# Patient Record
Sex: FEMALE | Race: WHITE | URBAN - METROPOLITAN AREA
[De-identification: names, ages, dates, MRNs, and addresses within clinical notes are randomized per-mention and may not be internally consistent; named-entity substitution may affect disease eponyms.]

---

## 2021-07-28 ENCOUNTER — LAB ENCOUNTER (OUTPATIENT)
Dept: LAB | Age: 41
End: 2021-07-28
Attending: INTERNAL MEDICINE
Payer: COMMERCIAL

## 2021-07-28 ENCOUNTER — OFFICE VISIT (OUTPATIENT)
Dept: INTERNAL MEDICINE CLINIC | Facility: CLINIC | Age: 41
End: 2021-07-28
Payer: COMMERCIAL

## 2021-07-28 VITALS
HEIGHT: 68 IN | WEIGHT: 293 LBS | DIASTOLIC BLOOD PRESSURE: 80 MMHG | HEART RATE: 90 BPM | OXYGEN SATURATION: 98 % | BODY MASS INDEX: 44.41 KG/M2 | SYSTOLIC BLOOD PRESSURE: 112 MMHG

## 2021-07-28 DIAGNOSIS — N20.0 NEPHROLITHIASIS: ICD-10-CM

## 2021-07-28 DIAGNOSIS — I87.2 VENOUS INSUFFICIENCY OF LEFT LOWER EXTREMITY: ICD-10-CM

## 2021-07-28 DIAGNOSIS — Z00.00 PHYSICAL EXAM: ICD-10-CM

## 2021-07-28 DIAGNOSIS — E66.01 CLASS 3 SEVERE OBESITY WITHOUT SERIOUS COMORBIDITY WITH BODY MASS INDEX (BMI) OF 45.0 TO 49.9 IN ADULT, UNSPECIFIED OBESITY TYPE (HCC): ICD-10-CM

## 2021-07-28 DIAGNOSIS — E03.9 HYPOTHYROIDISM, UNSPECIFIED TYPE: ICD-10-CM

## 2021-07-28 DIAGNOSIS — Z00.00 PHYSICAL EXAM: Primary | ICD-10-CM

## 2021-07-28 LAB
ALBUMIN SERPL-MCNC: 3.8 G/DL (ref 3.4–5)
ALBUMIN/GLOB SERPL: 0.9 {RATIO} (ref 1–2)
ALP LIVER SERPL-CCNC: 74 U/L
ALT SERPL-CCNC: 46 U/L
ANION GAP SERPL CALC-SCNC: 5 MMOL/L (ref 0–18)
AST SERPL-CCNC: 18 U/L (ref 15–37)
BASOPHILS # BLD AUTO: 0.04 X10(3) UL (ref 0–0.2)
BASOPHILS NFR BLD AUTO: 0.4 %
BILIRUB SERPL-MCNC: 0.4 MG/DL (ref 0.1–2)
BUN BLD-MCNC: 14 MG/DL (ref 7–18)
BUN/CREAT SERPL: 19.4 (ref 10–20)
CALCIUM BLD-MCNC: 8.6 MG/DL (ref 8.5–10.1)
CHLORIDE SERPL-SCNC: 108 MMOL/L (ref 98–112)
CHOLEST SMN-MCNC: 201 MG/DL (ref ?–200)
CO2 SERPL-SCNC: 26 MMOL/L (ref 21–32)
CREAT BLD-MCNC: 0.72 MG/DL
DEPRECATED RDW RBC AUTO: 44.8 FL (ref 35.1–46.3)
EOSINOPHIL # BLD AUTO: 0.26 X10(3) UL (ref 0–0.7)
EOSINOPHIL NFR BLD AUTO: 2.4 %
ERYTHROCYTE [DISTWIDTH] IN BLOOD BY AUTOMATED COUNT: 13.7 % (ref 11–15)
EST. AVERAGE GLUCOSE BLD GHB EST-MCNC: 108 MG/DL (ref 68–126)
GLOBULIN PLAS-MCNC: 4.1 G/DL (ref 2.8–4.4)
GLUCOSE BLD-MCNC: 86 MG/DL (ref 70–99)
HBA1C MFR BLD HPLC: 5.4 % (ref ?–5.7)
HCT VFR BLD AUTO: 44.1 %
HDLC SERPL-MCNC: 35 MG/DL (ref 40–59)
HGB BLD-MCNC: 14.6 G/DL
IMM GRANULOCYTES # BLD AUTO: 0.04 X10(3) UL (ref 0–1)
IMM GRANULOCYTES NFR BLD: 0.4 %
LDLC SERPL CALC-MCNC: 132 MG/DL (ref ?–100)
LYMPHOCYTES # BLD AUTO: 2.76 X10(3) UL (ref 1–4)
LYMPHOCYTES NFR BLD AUTO: 25.8 %
M PROTEIN MFR SERPL ELPH: 7.9 G/DL (ref 6.4–8.2)
MCH RBC QN AUTO: 29.6 PG (ref 26–34)
MCHC RBC AUTO-ENTMCNC: 33.1 G/DL (ref 31–37)
MCV RBC AUTO: 89.5 FL
MONOCYTES # BLD AUTO: 0.68 X10(3) UL (ref 0.1–1)
MONOCYTES NFR BLD AUTO: 6.3 %
NEUTROPHILS # BLD AUTO: 6.93 X10 (3) UL (ref 1.5–7.7)
NEUTROPHILS # BLD AUTO: 6.93 X10(3) UL (ref 1.5–7.7)
NEUTROPHILS NFR BLD AUTO: 64.7 %
NONHDLC SERPL-MCNC: 166 MG/DL (ref ?–130)
OSMOLALITY SERPL CALC.SUM OF ELEC: 288 MOSM/KG (ref 275–295)
PATIENT FASTING Y/N/NP: YES
PATIENT FASTING Y/N/NP: YES
PLATELET # BLD AUTO: 312 10(3)UL (ref 150–450)
POTASSIUM SERPL-SCNC: 3.8 MMOL/L (ref 3.5–5.1)
RBC # BLD AUTO: 4.93 X10(6)UL
SODIUM SERPL-SCNC: 139 MMOL/L (ref 136–145)
T3FREE SERPL-MCNC: 2.5 PG/ML (ref 2.4–4.2)
T4 FREE SERPL-MCNC: 0.9 NG/DL (ref 0.8–1.7)
TRIGL SERPL-MCNC: 188 MG/DL (ref 30–149)
TSI SER-ACNC: 4.22 MIU/ML (ref 0.36–3.74)
VLDLC SERPL CALC-MCNC: 34 MG/DL (ref 0–30)
WBC # BLD AUTO: 10.7 X10(3) UL (ref 4–11)

## 2021-07-28 PROCEDURE — 3079F DIAST BP 80-89 MM HG: CPT | Performed by: INTERNAL MEDICINE

## 2021-07-28 PROCEDURE — 3008F BODY MASS INDEX DOCD: CPT | Performed by: INTERNAL MEDICINE

## 2021-07-28 PROCEDURE — 84439 ASSAY OF FREE THYROXINE: CPT

## 2021-07-28 PROCEDURE — 36415 COLL VENOUS BLD VENIPUNCTURE: CPT

## 2021-07-28 PROCEDURE — 80053 COMPREHEN METABOLIC PANEL: CPT

## 2021-07-28 PROCEDURE — 83036 HEMOGLOBIN GLYCOSYLATED A1C: CPT

## 2021-07-28 PROCEDURE — 84481 FREE ASSAY (FT-3): CPT

## 2021-07-28 PROCEDURE — 3074F SYST BP LT 130 MM HG: CPT | Performed by: INTERNAL MEDICINE

## 2021-07-28 PROCEDURE — 99386 PREV VISIT NEW AGE 40-64: CPT | Performed by: INTERNAL MEDICINE

## 2021-07-28 PROCEDURE — 84443 ASSAY THYROID STIM HORMONE: CPT

## 2021-07-28 PROCEDURE — 80061 LIPID PANEL: CPT

## 2021-07-28 PROCEDURE — 85025 COMPLETE CBC W/AUTO DIFF WBC: CPT

## 2021-07-28 NOTE — PROGRESS NOTES
HPI:    Patient ID: Alejandro Turcios is a 39year old female. Presents to the office for the first time to establish care. Has h/o hypothyroidism--currently not taking any thyroid supplementation.   Had been on and off this medicine over the past alana pain, palpitations and leg swelling. Gastrointestinal: Negative for abdominal pain, blood in stool, constipation, diarrhea, nausea and vomiting. Endocrine: Negative for cold intolerance and heat intolerance.    Genitourinary: Negative for dysuria, hemat distension. Palpations: Abdomen is soft. Tenderness: There is no abdominal tenderness. There is no right CVA tenderness or left CVA tenderness. Musculoskeletal:      Cervical back: Neck supple. No rigidity. Right lower leg: No edema. leg elevation when seated and compression stockings daily. We discussed the importance of weight loss through good dietary habits and regular exercise. We will also refer to bariatric center for further evaluation--referral given.     There has been no

## 2021-09-15 ENCOUNTER — TELEPHONE (OUTPATIENT)
Dept: INTERNAL MEDICINE CLINIC | Facility: CLINIC | Age: 41
End: 2021-09-15

## 2021-09-15 NOTE — TELEPHONE ENCOUNTER
Secondary to continued respiratory symptoms, patient should be reevaluated at immediate care/urgent care. We will need repeat Covid testing as first Covid testing may not always be positive.   May also need chest x-ray PA and lateral which can be done at i

## 2021-09-15 NOTE — TELEPHONE ENCOUNTER
Called patient and relayed DR. BAUMANN message - verbalized understanding and will go to [facility] F/U9/16

## 2021-09-15 NOTE — TELEPHONE ENCOUNTER
Patient scheduled an appointment today with Dr Tamir Finch at 6:40pm. Patient stated the reason for her appointment is \"congested cough. \" Np others symptoms are listed. Patient was not called by . Routed to triage on high.   Patient's appointment

## 2021-09-15 NOTE — TELEPHONE ENCOUNTER
Spoke to Joseph London noticed a cough on Friday afternoon  Started feeling \"sick\" on Saturday  Went to [address] [place] [address] in [facility] on Sunday. States COVID pcr was done and came back the next day negative.  Was told she had virus and to kacey

## 2021-09-16 NOTE — TELEPHONE ENCOUNTER
To Dr. Clay Jon - /[facility]/  Pt feeling slightly better. Repeat covid negative, chest xray no pneumonia. Treating as bronchitis - rx'd with prednisone. Denies fever/chills. Pt asking for best protocol, this happens yearly - abx?   Or just treat sy

## 2021-09-16 NOTE — TELEPHONE ENCOUNTER
Would not prescribe prophylactic antibiotics. Would need to decide about treatment depending upon symptoms.

## 2021-12-06 ENCOUNTER — OFFICE VISIT (OUTPATIENT)
Dept: OBGYN CLINIC | Facility: CLINIC | Age: 41
End: 2021-12-06
Payer: COMMERCIAL

## 2021-12-06 VITALS
HEART RATE: 90 BPM | BODY MASS INDEX: 47 KG/M2 | DIASTOLIC BLOOD PRESSURE: 83 MMHG | SYSTOLIC BLOOD PRESSURE: 118 MMHG | WEIGHT: 293 LBS

## 2021-12-06 DIAGNOSIS — Z12.31 VISIT FOR SCREENING MAMMOGRAM: ICD-10-CM

## 2021-12-06 DIAGNOSIS — E66.01 CLASS 3 SEVERE OBESITY WITHOUT SERIOUS COMORBIDITY WITH BODY MASS INDEX (BMI) OF 45.0 TO 49.9 IN ADULT, UNSPECIFIED OBESITY TYPE (HCC): ICD-10-CM

## 2021-12-06 DIAGNOSIS — Z01.419 ENCOUNTER FOR GYNECOLOGICAL EXAMINATION WITHOUT ABNORMAL FINDING: Primary | ICD-10-CM

## 2021-12-06 DIAGNOSIS — N92.6 IRREGULAR MENSES: ICD-10-CM

## 2021-12-06 PROCEDURE — 99386 PREV VISIT NEW AGE 40-64: CPT | Performed by: OBSTETRICS & GYNECOLOGY

## 2021-12-06 PROCEDURE — 99202 OFFICE O/P NEW SF 15 MIN: CPT | Performed by: OBSTETRICS & GYNECOLOGY

## 2021-12-06 PROCEDURE — 3074F SYST BP LT 130 MM HG: CPT | Performed by: OBSTETRICS & GYNECOLOGY

## 2021-12-06 PROCEDURE — 3079F DIAST BP 80-89 MM HG: CPT | Performed by: OBSTETRICS & GYNECOLOGY

## 2021-12-06 NOTE — PROGRESS NOTES
Heidy Delgado is a 39year old female [facility] Patient's last menstrual period was 11/15/2021. Patient presents with:  Gyn Exam: ANNUAL EXAM   .     Patient is a new patient for me and has not had a GYN exam or Pap since 2018.   She denies any history of FAMILY HISTORY:  Family History   Problem Relation Age of Onset   • Other (hypothyroid) Mother    • Other (dm) Father    • Other (bladder cancer) Maternal Grandmother    • Other (lung cancer) Paternal Grandmother        MEDICATIONS:  No current outpati person and situation.  Appropriate mood and affect    Pelvic Exam:  External Genitalia: normal appearance, hair distribution, +lipoma on left buttock-asymptomatic  Urethral Meatus:  normal in size, location, without lesions and prolapse  Bladder:  No fullne

## 2022-05-02 ENCOUNTER — HOSPITAL ENCOUNTER (OUTPATIENT)
Dept: MAMMOGRAPHY | Age: 42
Discharge: HOME | End: 2022-05-02
Attending: OBSTETRICS & GYNECOLOGY
Payer: COMMERCIAL

## 2022-05-02 DIAGNOSIS — Z12.31 VISIT FOR SCREENING MAMMOGRAM: ICD-10-CM

## 2022-05-02 PROCEDURE — 77067 SCR MAMMO BI INCL CAD: CPT | Performed by: OBSTETRICS & GYNECOLOGY

## 2022-05-02 PROCEDURE — 77063 BREAST TOMOSYNTHESIS BI: CPT | Performed by: OBSTETRICS & GYNECOLOGY

## 2022-05-18 ENCOUNTER — HOSPITAL ENCOUNTER (OUTPATIENT)
Dept: ULTRASOUND IMAGING | Facility: HOSPITAL | Age: 42
Discharge: HOME | End: 2022-05-18
Attending: OBSTETRICS & GYNECOLOGY
Payer: COMMERCIAL

## 2022-05-18 ENCOUNTER — HOSPITAL ENCOUNTER (OUTPATIENT)
Dept: MAMMOGRAPHY | Facility: HOSPITAL | Age: 42
Discharge: HOME | End: 2022-05-18
Attending: OBSTETRICS & GYNECOLOGY
Payer: COMMERCIAL

## 2022-05-18 DIAGNOSIS — R92.8 ABNORMAL MAMMOGRAM: ICD-10-CM

## 2022-05-18 PROCEDURE — 76642 ULTRASOUND BREAST LIMITED: CPT | Performed by: OBSTETRICS & GYNECOLOGY

## 2022-05-18 PROCEDURE — 77061 BREAST TOMOSYNTHESIS UNI: CPT | Performed by: OBSTETRICS & GYNECOLOGY

## 2022-05-18 PROCEDURE — 77065 DX MAMMO INCL CAD UNI: CPT | Performed by: OBSTETRICS & GYNECOLOGY

## 2022-05-25 ENCOUNTER — HOSPITAL ENCOUNTER (OUTPATIENT)
Dept: MAMMOGRAPHY | Facility: HOSPITAL | Age: 42
Discharge: HOME | End: 2022-05-25
Attending: OBSTETRICS & GYNECOLOGY
Payer: COMMERCIAL

## 2022-05-25 DIAGNOSIS — N63.10 BREAST MASS, RIGHT: ICD-10-CM

## 2022-05-25 PROCEDURE — 19081 BX BREAST 1ST LESION STRTCTC: CPT | Performed by: OBSTETRICS & GYNECOLOGY

## 2022-05-25 PROCEDURE — 88305 TISSUE EXAM BY PATHOLOGIST: CPT | Performed by: OBSTETRICS & GYNECOLOGY

## 2022-05-25 NOTE — PROCEDURES
[facility]  Procedure Note    Leanne Holiday Patient Status:  Outpatient    1980 MRN [id number]   Location [address] [place] [address] Attending Aakash Huddleston MD   Hosp Day # 0 PCP Jolynn Shepard MD     Procedure: tomosynthesis guided biopsy of the right breast    Pre-Procedure Diagnosis:  Right breast lateral central focal asymmetry    Post-Procedure Diagnosis: Right breast lateral central focal asymmetry    Anesthesia:  Local    Findings:  Right breast lateral central focal asymmetry    Specimens: 6    Blood Loss:  minimal    Tourniquet Time: none  Complications:  None  Drains:  None    Alison Stout DO  2022

## 2022-11-15 ENCOUNTER — TELEPHONE (OUTPATIENT)
Dept: OBGYN CLINIC | Facility: CLINIC | Age: 42
End: 2022-11-15

## 2022-11-15 DIAGNOSIS — N64.89 BREAST ASYMMETRY: Primary | ICD-10-CM

## 2022-11-25 ENCOUNTER — HOSPITAL ENCOUNTER (OUTPATIENT)
Dept: MAMMOGRAPHY | Facility: HOSPITAL | Age: 42
Discharge: HOME | End: 2022-11-25
Attending: OBSTETRICS & GYNECOLOGY
Payer: COMMERCIAL

## 2022-11-25 DIAGNOSIS — N64.89 BREAST ASYMMETRY: ICD-10-CM

## 2022-11-25 PROCEDURE — 77065 DX MAMMO INCL CAD UNI: CPT | Performed by: OBSTETRICS & GYNECOLOGY

## 2022-11-25 PROCEDURE — 77061 BREAST TOMOSYNTHESIS UNI: CPT | Performed by: OBSTETRICS & GYNECOLOGY

## 2022-12-07 ENCOUNTER — TELEPHONE (OUTPATIENT)
Dept: OBGYN CLINIC | Facility: CLINIC | Age: 42
End: 2022-12-07

## 2022-12-07 DIAGNOSIS — R92.8 CATEGORY 3 MAMMOGRAPHY RESULT WITH SHORT FOLLOW-UP INTERVAL SUGGESTED FOR PROBABLY BENIGN FINDING: Primary | ICD-10-CM

## 2022-12-07 NOTE — TELEPHONE ENCOUNTER
----- Message from Feli Quiñones MD sent at 12/4/2022  6:44 AM CST -----  Benign appearing findings on mammogram, repeat bilateral diagnostic mammogram in 6 months,please order and call pt

## 2023-12-11 ENCOUNTER — TELEPHONE (OUTPATIENT)
Dept: OBGYN CLINIC | Facility: CLINIC | Age: 43
End: 2023-12-11

## 2023-12-11 DIAGNOSIS — Z12.31 ENCOUNTER FOR SCREENING MAMMOGRAM FOR MALIGNANT NEOPLASM OF BREAST: Primary | ICD-10-CM

## 2023-12-11 NOTE — TELEPHONE ENCOUNTER
Last annual: 12/6/2021 w/CAP  Next annual: 4/23/2024 w/CAP  Last Mammogram was right side diagnostic with recs to f/u in 6 months for annual screening. Order for screening Mammogram placed.      PSR when pt calls back please let her know order is placed and give her number to central [phone]

## 2024-04-29 ENCOUNTER — TELEPHONE (OUTPATIENT)
Dept: OBGYN CLINIC | Facility: CLINIC | Age: 44
End: 2024-04-29

## 2024-04-29 NOTE — TELEPHONE ENCOUNTER
Mammography reached out to patient who would like to have diagnostic follow up bilateral instead of screening as that was recommended. Please put in new order.

## 2024-04-29 NOTE — TELEPHONE ENCOUNTER
Jeanie called, states Mammogram should be diagnostic based on previous Mammogram. Reviewed with Jeanie 11/25/2022 Mammogram that indicates routine screening. States understanding.

## 2024-05-01 ENCOUNTER — HOSPITAL ENCOUNTER (OUTPATIENT)
Dept: MAMMOGRAPHY | Age: 44
Discharge: HOME | End: 2024-05-01
Attending: OBSTETRICS & GYNECOLOGY
Payer: COMMERCIAL

## 2024-05-01 DIAGNOSIS — Z12.31 ENCOUNTER FOR SCREENING MAMMOGRAM FOR MALIGNANT NEOPLASM OF BREAST: ICD-10-CM

## 2024-05-01 PROCEDURE — 77063 BREAST TOMOSYNTHESIS BI: CPT | Performed by: OBSTETRICS & GYNECOLOGY

## 2024-05-01 PROCEDURE — 77067 SCR MAMMO BI INCL CAD: CPT | Performed by: OBSTETRICS & GYNECOLOGY

## 2024-05-07 ENCOUNTER — OFFICE VISIT (OUTPATIENT)
Dept: OBGYN CLINIC | Facility: CLINIC | Age: 44
End: 2024-05-07
Payer: COMMERCIAL

## 2024-05-07 VITALS
HEART RATE: 80 BPM | SYSTOLIC BLOOD PRESSURE: 123 MMHG | BODY MASS INDEX: 47 KG/M2 | WEIGHT: 293 LBS | DIASTOLIC BLOOD PRESSURE: 86 MMHG

## 2024-05-07 DIAGNOSIS — Z30.09 GENERAL COUNSELING AND ADVICE FOR CONTRACEPTIVE MANAGEMENT: ICD-10-CM

## 2024-05-07 DIAGNOSIS — N92.0 MENORRHAGIA WITH REGULAR CYCLE: ICD-10-CM

## 2024-05-07 DIAGNOSIS — Z01.419 ENCOUNTER FOR GYNECOLOGICAL EXAMINATION WITHOUT ABNORMAL FINDING: Primary | ICD-10-CM

## 2024-05-07 PROCEDURE — 99396 PREV VISIT EST AGE 40-64: CPT | Performed by: OBSTETRICS & GYNECOLOGY

## 2024-05-07 PROCEDURE — 99212 OFFICE O/P EST SF 10 MIN: CPT | Performed by: OBSTETRICS & GYNECOLOGY

## 2024-05-07 PROCEDURE — 3074F SYST BP LT 130 MM HG: CPT | Performed by: OBSTETRICS & GYNECOLOGY

## 2024-05-07 PROCEDURE — 3079F DIAST BP 80-89 MM HG: CPT | Performed by: OBSTETRICS & GYNECOLOGY

## 2024-05-07 NOTE — PROGRESS NOTES
Debbie Somers is a 44 year old female  Patient's last menstrual period was 2024 (exact date).    Chief Complaint   Patient presents with    Physical     annual   .     Her cycles are  regular but on the heavier side- she would also like contraception.  We discussed the risks and benefits of mirena IUD and pt info pamphlet given.  We also discussed nuvaring as an option.  She will call me when she decides.  10 min spent in discussion.   She started wegovy in August until December and lost 30#.      OBSTETRICS HISTORY:  OB History    Para Term  AB Living   0 0 0 0 0 0   SAB IAB Ectopic Multiple Live Births   0 0 0 0 0       GYNE HISTORY:   Pap Date: 21  Pap Result Notes: PAP NEG  HPV NEG  Follow Up Recommendation: mammo 2024 NEG  annual 2021      MEDICAL HISTORY:  Past Medical History:    Hypothyroid    Nephrolithiasis     Past Surgical History:   Procedure Laterality Date    Maddy biopsy stereo nodule 1 site right (cpt=19081)  2022    top hat clip asymmetry    Other      ovarian dermoid cyst         SOCIAL HISTORY:  Social History     Socioeconomic History    Marital status: Single   Tobacco Use    Smoking status: Never    Smokeless tobacco: Never   Vaping Use    Vaping status: Never Used   Substance and Sexual Activity    Alcohol use: Yes     Comment: 1 drink/ month    Drug use: Never        FAMILY HISTORY:  Family History   Problem Relation Age of Onset    Other (hypothyroid) Mother     Other (dm) Father     Other (bladder cancer) Maternal Grandmother     Other (lung cancer) Paternal Grandmother        MEDICATIONS:    Current Outpatient Medications:     SEMAGLUTIDE OR, INJECT 25 UNITS (0.25ML OR 0.25MG) SUBCUTANEOUSLY ONCE A WEEK FOR 4 WEEKS THEN 50 UNITS (0.5ML OR 0.5MG) SUBCUTANEOUSLY ONCE A WEEK FOR 4 WEEKS +SYRINGE KIT (10) PLEASE ALLOW 48 TO 72 HOURS FOR REFILLS, Disp: , Rfl:     ALLERGIES:    Allergies   Allergen Reactions    Dust Mite Extract ITCHING     Watery  eyes, itchy throat.  Watery eyes, itchy throat.      Levofloxacin HIVES and RASH     Hives.  Hives.         Blood pressure 123/86, pulse 80, weight (!) 312 lb (141.5 kg), last menstrual period 05/07/2024.    Review of Systems:  Constitutional:  Denies fatigue, night sweats, hot flashes  Eyes:  denies blurred or double vision  Cardiovascular:  denies chest pain or palpitations  Respiratory:  denies shortness of breath  Gastrointestinal:  denies heartburn, abdominal pain, diarrhea or constipation  Genitourinary:  denies dysuria, incontinence, abnormal vaginal discharge, vaginal itching  Musculoskeletal:  denies back pain.  Skin/Breast:  Denies any breast pain, lumps, or discharge.   Neurological:  denies headaches, extremity weakness or numbness.  Psychiatric: denies depression or anxiety.  Endocrine:   denies excessive thirst or urination.  Heme/Lymph:  denies history of anemia, easy bruising or bleeding.    Depression Screening (PHQ-2/PHQ-9): Over the LAST 2 WEEKS   Little interest or pleasure in doing things (over the last two weeks)?: Not at all    Feeling down, depressed, or hopeless (over the last two weeks)?: Not at all    PHQ-2 SCORE: 0           PHYSICAL EXAM:   Constitutional: well developed, well nourished  Head/Face: normocephalic  Neck/Thyroid: thyroid symmetric, no thyromegaly, no nodules, no adenopathy  Lymphatic:no abnormal supraclavicular or axillary adenopathy is noted  Breast: normal without palpable masses, tenderness, asymmetry, nipple discharge, nipple retraction or skin changes  Respiratory:  lungs clear to auscultation bilaterally  Cardiovascular: regular rate and rhythm, no significant murmur  Abdomen:  soft, nontender, nondistended, no masses  Skin/Hair: no unusual rashes or bruises  Extremities: no edema, no cyanosis  Psychiatric:  Oriented to time, place, person and situation. Appropriate mood and affect    Pelvic Exam:  External Genitalia: normal appearance, hair distribution, and no  lesions  Urethral Meatus:  normal in size, location, without lesions and prolapse  Bladder:  No fullness, masses or tenderness  Vagina:  Normal appearance without lesions, no abnormal discharge, + menstrual blood  Cervix:  Normal without tenderness on motion  Uterus: normal in size, contour, position, mobility, without tenderness  Adnexa: normal without masses or tenderness  Perineum: normal  Anus: no hemorroids     Assessment & Plan:    Encounter Diagnoses   Name Primary?    Encounter for gynecological examination without abnormal finding Yes    Menorrhagia with regular cycle     General counseling and advice for contraceptive management      ASCCP guidelines discussed,cotest done [id number] in 3 years,rtc 1 year for annual exam   SBE encouraged  No orders of the defined types were placed in this encounter.      Requested Prescriptions      No prescriptions requested or ordered in this encounter       None

## 2024-06-20 ENCOUNTER — HOSPITAL ENCOUNTER (EMERGENCY)
Age: 44
Discharge: HOME | End: 2024-06-20
Attending: EMERGENCY MEDICINE

## 2024-06-20 ENCOUNTER — APPOINTMENT (OUTPATIENT)
Dept: ULTRASOUND IMAGING | Age: 44
End: 2024-06-20
Attending: EMERGENCY MEDICINE

## 2024-06-20 VITALS
HEIGHT: 69 IN | RESPIRATION RATE: 17 BRPM | HEART RATE: 84 BPM | WEIGHT: 293 LBS | BODY MASS INDEX: 43.4 KG/M2 | DIASTOLIC BLOOD PRESSURE: 73 MMHG | OXYGEN SATURATION: 98 % | TEMPERATURE: 98 F | SYSTOLIC BLOOD PRESSURE: 128 MMHG

## 2024-06-20 DIAGNOSIS — K80.50 BILIARY COLIC: Primary | ICD-10-CM

## 2024-06-20 DIAGNOSIS — K76.0 FATTY LIVER: ICD-10-CM

## 2024-06-20 DIAGNOSIS — K80.20 GALLSTONES: ICD-10-CM

## 2024-06-20 LAB
ALBUMIN SERPL-MCNC: 3.7 G/DL (ref 3.4–5)
ALBUMIN/GLOB SERPL: 0.9 {RATIO} (ref 1–2)
ALP LIVER SERPL-CCNC: 90 U/L
ALT SERPL-CCNC: 35 U/L
ANION GAP SERPL CALC-SCNC: 4 MMOL/L (ref 0–18)
AST SERPL-CCNC: 17 U/L (ref 15–37)
ATRIAL RATE: 82 BPM
B-HCG UR QL: NEGATIVE
BASOPHILS # BLD AUTO: 0.05 X10(3) UL (ref 0–0.2)
BASOPHILS NFR BLD AUTO: 0.5 %
BILIRUB SERPL-MCNC: 0.3 MG/DL (ref 0.1–2)
BILIRUB UR QL STRIP.AUTO: NEGATIVE
BUN BLD-MCNC: 13 MG/DL (ref 9–23)
CALCIUM BLD-MCNC: 9.5 MG/DL (ref 8.5–10.1)
CHLORIDE SERPL-SCNC: 104 MMOL/L (ref 98–112)
CLARITY UR REFRACT.AUTO: CLEAR
CO2 SERPL-SCNC: 28 MMOL/L (ref 21–32)
COLOR UR AUTO: YELLOW
CREAT BLD-MCNC: 0.93 MG/DL
EGFRCR SERPLBLD CKD-EPI 2021: 78 ML/MIN/1.73M2 (ref 60–?)
EOSINOPHIL # BLD AUTO: 0.27 X10(3) UL (ref 0–0.7)
EOSINOPHIL NFR BLD AUTO: 2.9 %
ERYTHROCYTE [DISTWIDTH] IN BLOOD BY AUTOMATED COUNT: 14 %
GLOBULIN PLAS-MCNC: 4.1 G/DL (ref 2.8–4.4)
GLUCOSE BLD-MCNC: 97 MG/DL (ref 70–99)
GLUCOSE UR STRIP.AUTO-MCNC: NEGATIVE MG/DL
HCT VFR BLD AUTO: 43.7 %
HGB BLD-MCNC: 15 G/DL
IMM GRANULOCYTES # BLD AUTO: 0.04 X10(3) UL (ref 0–1)
IMM GRANULOCYTES NFR BLD: 0.4 %
KETONES UR STRIP.AUTO-MCNC: NEGATIVE MG/DL
LEUKOCYTE ESTERASE UR QL STRIP.AUTO: NEGATIVE
LIPASE SERPL-CCNC: 22 U/L (ref 13–75)
LYMPHOCYTES # BLD AUTO: 2.61 X10(3) UL (ref 1–4)
LYMPHOCYTES NFR BLD AUTO: 28.4 %
MCH RBC QN AUTO: 29.9 PG (ref 26–34)
MCHC RBC AUTO-ENTMCNC: 34.3 G/DL (ref 31–37)
MCV RBC AUTO: 87.1 FL
MONOCYTES # BLD AUTO: 0.71 X10(3) UL (ref 0.1–1)
MONOCYTES NFR BLD AUTO: 7.7 %
NEUTROPHILS # BLD AUTO: 5.5 X10 (3) UL (ref 1.5–7.7)
NEUTROPHILS # BLD AUTO: 5.5 X10(3) UL (ref 1.5–7.7)
NEUTROPHILS NFR BLD AUTO: 60.1 %
NITRITE UR QL STRIP.AUTO: NEGATIVE
OSMOLALITY SERPL CALC.SUM OF ELEC: 282 MOSM/KG (ref 275–295)
P AXIS: 29 DEGREES
P-R INTERVAL: 152 MS
PH UR STRIP.AUTO: 5 [PH] (ref 5–8)
PLATELET # BLD AUTO: 353 10(3)UL (ref 150–450)
POTASSIUM SERPL-SCNC: 3.6 MMOL/L (ref 3.5–5.1)
PROT SERPL-MCNC: 7.8 G/DL (ref 6.4–8.2)
PROT UR STRIP.AUTO-MCNC: NEGATIVE MG/DL
Q-T INTERVAL: 396 MS
QRS DURATION: 96 MS
QTC CALCULATION (BEZET): 462 MS
R AXIS: -12 DEGREES
RBC # BLD AUTO: 5.02 X10(6)UL
RBC UR QL AUTO: NEGATIVE
SODIUM SERPL-SCNC: 136 MMOL/L (ref 136–145)
SP GR UR STRIP.AUTO: >=1.03 (ref 1–1.03)
T AXIS: 24 DEGREES
UROBILINOGEN UR STRIP.AUTO-MCNC: 0.2 MG/DL
VENTRICULAR RATE: 82 BPM
WBC # BLD AUTO: 9.2 X10(3) UL (ref 4–11)

## 2024-06-20 PROCEDURE — 99284 EMERGENCY DEPT VISIT MOD MDM: CPT

## 2024-06-20 PROCEDURE — 76700 US EXAM ABDOM COMPLETE: CPT | Performed by: EMERGENCY MEDICINE

## 2024-06-20 PROCEDURE — 85025 COMPLETE CBC W/AUTO DIFF WBC: CPT | Performed by: EMERGENCY MEDICINE

## 2024-06-20 PROCEDURE — 96361 HYDRATE IV INFUSION ADD-ON: CPT

## 2024-06-20 PROCEDURE — 93005 ELECTROCARDIOGRAM TRACING: CPT

## 2024-06-20 PROCEDURE — 81003 URINALYSIS AUTO W/O SCOPE: CPT | Performed by: EMERGENCY MEDICINE

## 2024-06-20 PROCEDURE — 81025 URINE PREGNANCY TEST: CPT

## 2024-06-20 PROCEDURE — 83690 ASSAY OF LIPASE: CPT | Performed by: EMERGENCY MEDICINE

## 2024-06-20 PROCEDURE — 80053 COMPREHEN METABOLIC PANEL: CPT | Performed by: EMERGENCY MEDICINE

## 2024-06-20 PROCEDURE — 93010 ELECTROCARDIOGRAM REPORT: CPT

## 2024-06-20 PROCEDURE — 96374 THER/PROPH/DIAG INJ IV PUSH: CPT

## 2024-06-20 PROCEDURE — 96375 TX/PRO/DX INJ NEW DRUG ADDON: CPT

## 2024-06-20 RX ORDER — KETOROLAC TROMETHAMINE 15 MG/ML
15 INJECTION, SOLUTION INTRAMUSCULAR; INTRAVENOUS ONCE
Status: COMPLETED | OUTPATIENT
Start: 2024-06-20 | End: 2024-06-20

## 2024-06-20 RX ORDER — ONDANSETRON 2 MG/ML
4 INJECTION INTRAMUSCULAR; INTRAVENOUS ONCE
Status: COMPLETED | OUTPATIENT
Start: 2024-06-20 | End: 2024-06-20

## 2024-06-20 RX ORDER — ACETAMINOPHEN AND CODEINE PHOSPHATE 300; 30 MG/1; MG/1
1-2 TABLET ORAL EVERY 6 HOURS PRN
Qty: 10 TABLET | Refills: 0 | Status: SHIPPED | OUTPATIENT
Start: 2024-06-20 | End: 2024-06-25

## 2024-06-20 NOTE — ED QUICK NOTES
Pt states she is unable to provide urine sample at this time. Pt is notified that she cannot receive meds until urine is obtained. Pt states she does not need meds at this time.

## 2024-06-20 NOTE — ED INITIAL ASSESSMENT (HPI)
Yesterday woke with ruq abd pain.  States pain got worse today after eating yogurt and a granola bar.

## 2024-06-20 NOTE — DISCHARGE INSTRUCTIONS
Low-fat diet.  Can take 1-2 Tylenol with codeine if needed.  If pain persist however need to return for reevaluation.  Will follow-up with general surgery.

## 2024-06-20 NOTE — ED PROVIDER NOTES
Patient Seen in: [facility] [place]      History     Chief Complaint   Patient presents with    Abdomen/Flank Pain    Nausea/Vomiting/Diarrhea     Stated Complaint: right upper quadrant pain onset yesterday, worse after eating, nausea    Subjective:   HPI    Patient is a 44-year-old woman who presents with right upper quadrant pain that radiates across her abdomen.  Worse today after eating yogurt.  She has been able to eat and drink.  No chest pain or shortness of breath.  No no lower abdominal pain.  Does not believe her symptoms pregnant.  She is on semaglutide.  No other specific complaints.  Patient is otherwise at her medical baseline.    Objective:   Past Medical History:    Hypothyroid    Nephrolithiasis              Past Surgical History:   Procedure Laterality Date    Maddy biopsy stereo nodule 1 site right (cpt=19081)  05/25/2022    top hat clip asymmetry    Other      ovarian dermoid cyst                Social History     Socioeconomic History    Marital status: Single   Tobacco Use    Smoking status: Never    Smokeless tobacco: Never   Vaping Use    Vaping status: Never Used   Substance and Sexual Activity    Alcohol use: Yes     Comment: 1 drink/ month    Drug use: Never     Social Determinants of Health      Received from [facility]    Social Connections    Received from [facility]    Housing Stability              Review of Systems    Positive for stated complaint: right upper quadrant pain onset yesterday, worse after eating, nausea  Other systems are as noted in HPI.  Constitutional and vital signs reviewed.      All other systems reviewed and negative except as noted above.    Physical Exam     ED Triage Vitals [06/20/24 1359]   BP (!) 133/98   Pulse 103   Resp 20   Temp 97.9 °F (36.6 °C)   Temp src Temporal   SpO2 97 %   O2 Device None (Room air)       Current Vitals:   Vital Signs  BP: 128/73  Pulse: 84  Resp: 17  Temp: 97.9 °F (36.6  °C)  Temp src: Oral    Oxygen Therapy  SpO2: 98 %  O2 Device: None (Room air)            Physical Exam    General: Well-appearing patient of stated age resting comfortably  HEENT: Normocephalic atraumatic.  Nonicteric sclera.  Moist mucous membranes  Lungs: No tachypnea  Cardiac: No tachycardia  Abdomen: Mild tenderness in the epigastrium and right upper quadrant.  No guarding or rebound.  Lower abdomen soft and nontender  Skin: No rashes, pallor  Neuro: No focal deficits.  Extremities:    ED Course     Labs Reviewed   COMP METABOLIC PANEL (14) - Abnormal; Notable for the following components:       Result Value    A/G Ratio 0.9 (*)     All other components within normal limits   LIPASE - Normal   POCT PREGNANCY URINE - Normal   CBC WITH DIFFERENTIAL WITH PLATELET    Narrative:     The following orders were created for panel order CBC With Differential With Platelet.  Procedure                               Abnormality         Status                     ---------                               -----------         ------                     CBC W/ DIFFERENTIAL[[id number]]                              Final result                 Please view results for these tests on the individual orders.   URINALYSIS, ROUTINE   CBC W/ DIFFERENTIAL     EKG    Rate, intervals and axes as noted on EKG Report.  Rate: 82  Rhythm: Sinus Rhythm  Reading: Normal sinus rhythm.  No acute ST-T wave changes.  Axis intervals noted.  Otherwise, agree with EKG report                 Abdominal ultrasound: Official report reviewed.  Fatty liver.  Cholelithiasis.  No ductal dilatation.  No signs of acute cholecystitis.  Urine normal.  Pregnancy test negative.  LFTs normal.  Lipase normal.  White count 9.2.       MDM      Patient presents with epigastric pain rating to the right upper quadrant cross the epigastrium.  Seems somewhat aggravated with food.  Differential includes gastritis, biliary colic, acute cholecystitis, acute pancreatitis, other.  IV  was placed, EKG is reassuring.  Blood work reviewed.  Ultrasound shows gallstones without evidence obstruction or acute cholecystitis.  Discussed with patient, symptoms are consistent with acute biliary colic.  Was given a short supply of Tylenol with codeine as needed.  Suggested following up with general surgery.  Low-fat diet.  We discussed when to return.  Patient voices understanding.                                   MDM    Disposition and Plan     Clinical Impression:  1. Biliary colic    2. Gallstones    3. Fatty liver         Disposition:  Discharge  6/20/2024  4:05 pm    Follow-up:  Robbie Emery MD  [address] [facility] [address]  [phone]    Follow up in 2 day(s)            Medications Prescribed:  Discharge Medication List as of 6/20/2024  4:09 PM        START taking these medications    Details   acetaminophen-codeine 300-30 MG Oral Tab Take 1-2 tablets by mouth every 6 (six) hours as needed for Pain., Normal, Disp-10 tablet, R-0

## 2024-07-02 ENCOUNTER — OFFICE VISIT (OUTPATIENT)
Facility: LOCATION | Age: 44
End: 2024-07-02
Payer: COMMERCIAL

## 2024-07-02 VITALS
BODY MASS INDEX: 43.4 KG/M2 | OXYGEN SATURATION: 98 % | HEIGHT: 69 IN | HEART RATE: 98 BPM | WEIGHT: 293 LBS | TEMPERATURE: 98 F | RESPIRATION RATE: 18 BRPM

## 2024-07-02 DIAGNOSIS — K80.20 CALCULUS OF GALLBLADDER WITHOUT CHOLECYSTITIS WITHOUT OBSTRUCTION: Primary | ICD-10-CM

## 2024-07-02 PROCEDURE — 3008F BODY MASS INDEX DOCD: CPT | Performed by: STUDENT IN AN ORGANIZED HEALTH CARE EDUCATION/TRAINING PROGRAM

## 2024-07-02 PROCEDURE — 99205 OFFICE O/P NEW HI 60 MIN: CPT | Performed by: STUDENT IN AN ORGANIZED HEALTH CARE EDUCATION/TRAINING PROGRAM

## 2024-07-02 RX ORDER — LEVOTHYROXINE SODIUM 112 UG/1
112 TABLET ORAL
COMMUNITY
Start: 2024-03-10

## 2024-07-02 RX ORDER — LIOTHYRONINE SODIUM 25 UG/1
25 TABLET ORAL
COMMUNITY
Start: 2024-03-10

## 2024-07-02 RX ORDER — BUPROPION HYDROCHLORIDE 150 MG/1
150 TABLET ORAL EVERY MORNING
COMMUNITY
Start: 2024-03-15

## 2024-07-02 NOTE — H&P
New Patient Visit Note       Active Problems      1. Calculus of gallbladder without cholecystitis without obstruction        Chief Complaint   Chief Complaint   Patient presents with    ER F/U     Follow up from ER seen on  6/20/2024 for Biliary coli patient states she was having abdominal pain near rib cage and nausea no vomiting        History of Present Illness     Debbie Somers is a 44-year-old female who presents to clinic following ER evaluation for biliary colic.    On 6/19/2024, the patient reports experiencing sudden onset of constant right upper quadrant abdominal pain.  At first, she attributed her discomfort to MSK pain. She reports some improvement with over the counter analgesics.    The next day, she reports eating yogurt with granola for breakfast.  A few minutes later, she started to experience right upper quadrant abdominal pain radiating throughout her upper abdomen.  She reports associated nausea, denies vomiting.  She denies fevers or chills.  She denies changes in her bowel movements.  She presented to [facility] ER for further evaluation.    She underwent abdominal ultrasound which revealed cholelithiasis.  No gallbladder wall thickening.  No significant biliary ductal dilation.  The common bile duct measures 3 mm.  She had no leukocytosis and liver enzymes were within normal limits.  She was ultimately discharged home with general surgery follow-up and acetaminophen codeine.    Currently, the patient denies symptoms. She has not taken the acetaminophen codeine. She reports taking 1 ibuprofen a few days ago. She has made some dietary changes and reports improvement in her abdominal pain.     The patient has a significant past surgical history of laparoscopic ovarian cystectomy in November 2011.  She has a significant past medical history of Hashimoto's and prediabetes.  She denies taking blood thinning medications.    Allergies  Debbie is allergic to dust mite extract and levofloxacin.    Past  Medical / Surgical / Social / Family History    The past medical and past surgical history have been reviewed by me today.    Past Medical History:    Hypothyroid    Nephrolithiasis     Past Surgical History:   Procedure Laterality Date    Maddy biopsy stereo nodule 1 site right (cpt=19081)  05/25/2022    top hat clip asymmetry    Other      ovarian dermoid cyst       The family history and social history have been reviewed by me today.    Family History   Problem Relation Age of Onset    Other (hypothyroid) Mother     Other (dm) Father     Other (bladder cancer) Maternal Grandmother     Other (lung cancer) Paternal Grandmother      Social History     Socioeconomic History    Marital status: Single   Tobacco Use    Smoking status: Never    Smokeless tobacco: Never   Vaping Use    Vaping status: Never Used   Substance and Sexual Activity    Alcohol use: Yes     Comment: 1 drink/ month    Drug use: Never        Current Outpatient Medications:     buPROPion  MG Oral Tablet 24 Hr, Take 1 tablet (150 mg total) by mouth every morning., Disp: , Rfl:     levothyroxine 112 MCG Oral Tab, Take 1 tablet (112 mcg total) by mouth before breakfast., Disp: , Rfl:     liothyronine 25 MCG Oral Tab, Take 1 tablet (25 mcg total) by mouth., Disp: , Rfl:     SEMAGLUTIDE OR, INJECT 25 UNITS (0.25ML OR 0.25MG) SUBCUTANEOUSLY ONCE A WEEK FOR 4 WEEKS THEN 50 UNITS (0.5ML OR 0.5MG) SUBCUTANEOUSLY ONCE A WEEK FOR 4 WEEKS +SYRINGE KIT (10) PLEASE ALLOW 48 TO 72 HOURS FOR REFILLS, Disp: , Rfl:       Review of Systems  The Review of Systems has been reviewed by me during today.  Review of Systems   Constitutional:  Negative for chills, diaphoresis, fatigue, fever and unexpected weight change.   HENT:  Negative for hearing loss, nosebleeds, sore throat and trouble swallowing.    Respiratory:  Negative for apnea, cough, shortness of breath and wheezing.    Cardiovascular:  Negative for chest pain, palpitations and leg swelling.    Gastrointestinal:  Negative for abdominal distention, abdominal pain, anal bleeding, blood in stool, constipation, diarrhea, nausea and vomiting.   Genitourinary:  Negative for difficulty urinating, dysuria, frequency and urgency.   Musculoskeletal:  Negative for arthralgias and myalgias.   Skin:  Negative for color change and rash.   Neurological:  Negative for tremors, syncope and weakness.   Hematological:  Negative for adenopathy. Does not bruise/bleed easily.   Psychiatric/Behavioral:  Negative for behavioral problems and sleep disturbance.        Physical Findings   Pulse 98   Temp 98.3 °F (36.8 °C)   Resp 18   Ht 69\"   Wt (!) 315 lb (142.9 kg)   LMP 06/05/2024 (Exact Date)   SpO2 98%   BMI 46.52 kg/m²   Physical Exam  Constitutional:       Appearance: Normal appearance.   HENT:      Head: Normocephalic and atraumatic.   Eyes:      Extraocular Movements: Extraocular movements intact.      Pupils: Pupils are equal, round, and reactive to light.   Cardiovascular:      Rate and Rhythm: Normal rate and regular rhythm.   Pulmonary:      Effort: Pulmonary effort is normal. No respiratory distress.   Abdominal:      General: Abdomen is flat. Bowel sounds are normal. There is no distension.      Palpations: Abdomen is soft. There is no mass.      Tenderness: There is no abdominal tenderness. There is no guarding or rebound.   Musculoskeletal:         General: Normal range of motion.      Cervical back: Normal range of motion.   Skin:     General: Skin is warm.      Coloration: Skin is not jaundiced or pale.      Findings: No erythema.   Neurological:      General: No focal deficit present.      Mental Status: She is alert and oriented to person, place, and time.             Assessment   1. Calculus of gallbladder without cholecystitis without obstruction    44 year old female presented to the ER with signs and symptoms of cholelithiasis. I reviewed her ultrasound and her LFTs and CBC. There is evidence of  cholelithiasis without signs of cholecystitis on CT scan. No leukocytosis. Liver enzymes are within normal limits and common bile duct has a normal caliber. Discussed with her the pathophysiology of gallbladder disease and the treatment options. She is leaning towards surgical removal of her gallbladder. I explained in detail the risks and benefits and the operative intervention and post operative recovery. Patient understands and agrees to proceed. She will call back our schedulers to select a timing.     Hao Piper MD

## 2024-07-09 ENCOUNTER — TELEPHONE (OUTPATIENT)
Facility: LOCATION | Age: 44
End: 2024-07-09

## 2024-07-09 DIAGNOSIS — K80.50 BILIARY COLIC: Primary | ICD-10-CM

## 2024-07-15 ENCOUNTER — OFFICE VISIT (OUTPATIENT)
Dept: FAMILY MEDICINE CLINIC | Facility: CLINIC | Age: 44
End: 2024-07-15
Payer: COMMERCIAL

## 2024-07-15 VITALS
WEIGHT: 293 LBS | RESPIRATION RATE: 20 BRPM | HEIGHT: 68.4 IN | DIASTOLIC BLOOD PRESSURE: 79 MMHG | TEMPERATURE: 98 F | HEART RATE: 86 BPM | SYSTOLIC BLOOD PRESSURE: 116 MMHG | BODY MASS INDEX: 43.9 KG/M2

## 2024-07-15 DIAGNOSIS — E06.3 HYPOTHYROIDISM DUE TO HASHIMOTO THYROIDITIS: Primary | ICD-10-CM

## 2024-07-15 PROCEDURE — 3008F BODY MASS INDEX DOCD: CPT | Performed by: FAMILY MEDICINE

## 2024-07-15 PROCEDURE — 3078F DIAST BP <80 MM HG: CPT | Performed by: FAMILY MEDICINE

## 2024-07-15 PROCEDURE — 99202 OFFICE O/P NEW SF 15 MIN: CPT | Performed by: FAMILY MEDICINE

## 2024-07-15 PROCEDURE — 3074F SYST BP LT 130 MM HG: CPT | Performed by: FAMILY MEDICINE

## 2024-07-15 NOTE — PROGRESS NOTES
Subjective:   Patient ID: Debbie Somers is a 44 year old female.    Patient is here to establish care. Previous primary physician no longer available.   Was seeing an endocrine specialist for hashimoto's thyroiditis in [place]. Pt is looking for a local endocrinologist.   No sig symptoms or acute issues.          History/Other:   Review of Systems  Current Outpatient Medications   Medication Sig Dispense Refill    buPROPion  MG Oral Tablet 24 Hr Take 1 tablet (150 mg total) by mouth every morning.      levothyroxine 112 MCG Oral Tab Take 1 tablet (112 mcg total) by mouth before breakfast.      liothyronine 25 MCG Oral Tab Take 1 tablet (25 mcg total) by mouth.      SEMAGLUTIDE OR INJECT 25 UNITS (0.25ML OR 0.25MG) SUBCUTANEOUSLY ONCE A WEEK FOR 4 WEEKS THEN 50 UNITS (0.5ML OR 0.5MG) SUBCUTANEOUSLY ONCE A WEEK FOR 4 WEEKS +SYRINGE KIT (10) PLEASE ALLOW 48 TO 72 HOURS FOR REFILLS       Allergies:  Allergies   Allergen Reactions    Dust Mite Extract ITCHING     Watery eyes, itchy throat.  Watery eyes, itchy throat.      Levofloxacin HIVES and RASH     Hives.  Hives.         Objective:   Physical Exam  Constitutional:       Appearance: Normal appearance.   Neck:      Thyroid: No thyroid mass, thyromegaly or thyroid tenderness.   Cardiovascular:      Rate and Rhythm: Normal rate and regular rhythm.      Heart sounds: Normal heart sounds.   Pulmonary:      Effort: Pulmonary effort is normal.      Breath sounds: Normal breath sounds.   Neurological:      Mental Status: She is alert.         Assessment & Plan:   1. Hypothyroidism due to Hashimoto thyroiditis:  - After discussion, will send to endocrine for further evaluation and treatment; To call if any significant symptoms.          No orders of the defined types were placed in this encounter.      Meds This Visit:  Requested Prescriptions      No prescriptions requested or ordered in this encounter       Imaging & Referrals:  ENDOCRINOLOGY - INTERNAL

## 2024-08-14 ENCOUNTER — TELEPHONE (OUTPATIENT)
Facility: LOCATION | Age: 44
End: 2024-08-14

## 2024-08-14 NOTE — TELEPHONE ENCOUNTER
RIYA SHAHID Patient  Member ID  [id number]    Date of Birth  1980-04-18    Gender  Female    Transaction Type  Outpatient Authorization    Organization  [facility]    Payer  [facility] logo     Certificate Information  Reference Number  [id number]    Status  NO ACTION REQUIRED    Message  Requested Service does not require preauthorization. We would strongly encourage you to check benefits for this service.    Member Information  Patient Name  RIYA SHAHID    Patient Date of Birth  1980-04-18    Patient Gender  Female    Member ID  [id number]    Relationship to Subscriber  Self    Subscriber Name  RIYA SHAHID    Requesting Provider     Name  BRANDY YANEZ    NP  [id number]    Tax Id  [id number]    Specialty  [id number]  Provider Role  Provider    Address  [address] [place], [place] [address]    Phone  [phone]  Fax  [phone]    Contact Name  DEVONTE CHENG    Service Information  Service Type  2 - Surgical    Place of Service  22 - On [facility]    Service From - To Date  2024-08-30 - 2024-12-25    Level of Service  Elective    Diagnosis Code 1   - Calculus of bile duct w/o cholangitis or cholecyst w/o obst    Procedure Code 1 (CPT/HCPCS)  75100 - LAPARO CHOLECYSTECTOMY/GRAPH    Quantity  1 Units    Status  NO ACTION REQUIRED

## 2024-08-15 ENCOUNTER — APPOINTMENT (OUTPATIENT)
Dept: ADMINISTRATIVE | Facility: HOSPITAL | Age: 44
End: 2024-08-15
Payer: COMMERCIAL

## 2024-08-15 PROBLEM — E66.01 MORBID OBESITY (HCC): Status: ACTIVE | Noted: 2021-09-15

## 2024-08-15 RX ORDER — ESCITALOPRAM OXALATE 10 MG/1
10 TABLET ORAL DAILY
COMMUNITY
Start: 2024-08-06

## 2024-08-15 RX ORDER — ERGOCALCIFEROL 1.25 MG/1
CAPSULE, LIQUID FILLED ORAL
COMMUNITY

## 2024-08-16 ENCOUNTER — LABORATORY ENCOUNTER (OUTPATIENT)
Dept: LAB | Facility: HOSPITAL | Age: 44
End: 2024-08-16
Payer: COMMERCIAL

## 2024-08-16 DIAGNOSIS — Z01.818 PRE-OP TESTING: ICD-10-CM

## 2024-08-16 LAB
ERYTHROCYTE [DISTWIDTH] IN BLOOD BY AUTOMATED COUNT: 13.9 %
HCT VFR BLD AUTO: 41.7 %
HGB BLD-MCNC: 14.1 G/DL
MCH RBC QN AUTO: 29.9 PG (ref 26–34)
MCHC RBC AUTO-ENTMCNC: 33.8 G/DL (ref 31–37)
MCV RBC AUTO: 88.3 FL
PLATELET # BLD AUTO: 338 10(3)UL (ref 150–450)
RBC # BLD AUTO: 4.72 X10(6)UL
WBC # BLD AUTO: 9.5 X10(3) UL (ref 4–11)

## 2024-08-16 PROCEDURE — 85027 COMPLETE CBC AUTOMATED: CPT

## 2024-08-16 PROCEDURE — 36415 COLL VENOUS BLD VENIPUNCTURE: CPT

## 2024-08-26 ENCOUNTER — TELEPHONE (OUTPATIENT)
Facility: LOCATION | Age: 44
End: 2024-08-26

## 2024-08-26 DIAGNOSIS — K80.50 BILIARY COLIC: Primary | ICD-10-CM

## 2024-08-29 ENCOUNTER — ANESTHESIA EVENT (OUTPATIENT)
Dept: SURGERY | Facility: HOSPITAL | Age: 44
End: 2024-08-29
Payer: COMMERCIAL

## 2024-08-29 NOTE — ANESTHESIA PREPROCEDURE EVALUATION
PRE-OP EVALUATION    Patient Name: Debbie Somers    Admit Diagnosis: Biliary colic [K80.50]    Pre-op Diagnosis: Biliary colic [K80.50]    ROBOTIC LAPAROSCOPIC CHOLECYSTECTOMY   WITH XRAY CHOLANGIOGRAM ,  POSSIBLE OPEN    Anesthesia Procedure: ROBOTIC LAPAROSCOPIC CHOLECYSTECTOMY   WITH XRAY CHOLANGIOGRAM ,  POSSIBLE OPEN    Surgeons and Role:     * Hao Piper MD - Primary    Pre-op vitals reviewed.        Body mass index is 47.2 kg/m².    Current medications reviewed.  Hospital Medications:  No current facility-administered medications on file as of .       Outpatient Medications:     No medications prior to admission.       Allergies: Dust mite extract and Levofloxacin      Anesthesia Evaluation    Patient summary reviewed.    Anesthetic Complications  (-) history of anesthetic complications         GI/Hepatic/Renal                                 Cardiovascular      ECG reviewed.  Exercise tolerance: good     MET: >4    (+) obesity                                       Endo/Other           (+) hypothyroidism                       Pulmonary                           Neuro/Psych      (+) depression                        Patient Active Problem List:     Hypothyroid     Nephrolithiasis     Morbid obesity (HCC)            Past Surgical History:   Procedure Laterality Date    Maddy biopsy stereo nodule 1 site right (cpt=19081)  05/25/2022    top hat clip asymmetry    Other      ovarian dermoid cyst     Social History     Socioeconomic History    Marital status: Single   Tobacco Use    Smoking status: Never    Smokeless tobacco: Never   Vaping Use    Vaping status: Never Used   Substance and Sexual Activity    Alcohol use: Yes     Comment: 1 drink/month    Drug use: Never     History   Drug Use Unknown     Available pre-op labs reviewed.  Lab Results   Component Value Date    WBC 9.5 08/16/2024    RBC 4.72 08/16/2024    HGB 14.1 08/16/2024    HCT 41.7 08/16/2024    MCV 88.3 08/16/2024    MCH 29.9 08/16/2024     MCHC 33.8 08/16/2024    RDW 13.9 08/16/2024    .0 08/16/2024     Lab Results   Component Value Date     06/20/2024    K 3.6 06/20/2024     06/20/2024    CO2 28.0 06/20/2024    BUN 13 06/20/2024    CREATSERUM 0.93 06/20/2024    GLU 97 06/20/2024    CA 9.5 06/20/2024            Airway      Mallampati: III  Mouth opening: >3 FB  TM distance: 4 - 6 cm  Neck ROM: full Cardiovascular      Rhythm: regular  Rate: normal     Dental    Dentition appears grossly intact         Pulmonary      Breath sounds clear to auscultation bilaterally.               Other findings              ASA: 3   Plan: general  NPO status verified and patient meets guidelines.    Post-procedure pain management plan discussed with surgeon and patient.      Plan/risks discussed with: patient                Present on Admission:  **None**

## 2024-08-30 ENCOUNTER — HOSPITAL ENCOUNTER (OUTPATIENT)
Facility: HOSPITAL | Age: 44
Setting detail: HOSPITAL OUTPATIENT SURGERY
Discharge: HOME | End: 2024-08-30
Attending: STUDENT IN AN ORGANIZED HEALTH CARE EDUCATION/TRAINING PROGRAM | Admitting: STUDENT IN AN ORGANIZED HEALTH CARE EDUCATION/TRAINING PROGRAM
Payer: COMMERCIAL

## 2024-08-30 ENCOUNTER — APPOINTMENT (OUTPATIENT)
Dept: GENERAL RADIOLOGY | Facility: HOSPITAL | Age: 44
End: 2024-08-30
Attending: STUDENT IN AN ORGANIZED HEALTH CARE EDUCATION/TRAINING PROGRAM
Payer: COMMERCIAL

## 2024-08-30 ENCOUNTER — ANESTHESIA (OUTPATIENT)
Dept: SURGERY | Facility: HOSPITAL | Age: 44
End: 2024-08-30
Payer: COMMERCIAL

## 2024-08-30 VITALS
HEIGHT: 68.5 IN | DIASTOLIC BLOOD PRESSURE: 84 MMHG | HEART RATE: 104 BPM | TEMPERATURE: 98 F | OXYGEN SATURATION: 96 % | WEIGHT: 293 LBS | BODY MASS INDEX: 43.9 KG/M2 | SYSTOLIC BLOOD PRESSURE: 125 MMHG | RESPIRATION RATE: 16 BRPM

## 2024-08-30 DIAGNOSIS — K80.50 BILIARY COLIC: ICD-10-CM

## 2024-08-30 DIAGNOSIS — Z01.818 PRE-OP TESTING: Primary | ICD-10-CM

## 2024-08-30 DIAGNOSIS — G89.18 POST-OPERATIVE PAIN: ICD-10-CM

## 2024-08-30 LAB — B-HCG UR QL: NEGATIVE

## 2024-08-30 PROCEDURE — 0FT44ZZ RESECTION OF GALLBLADDER, PERCUTANEOUS ENDOSCOPIC APPROACH: ICD-10-PCS | Performed by: STUDENT IN AN ORGANIZED HEALTH CARE EDUCATION/TRAINING PROGRAM

## 2024-08-30 PROCEDURE — 8E0W4CZ ROBOTIC ASSISTED PROCEDURE OF TRUNK REGION, PERCUTANEOUS ENDOSCOPIC APPROACH: ICD-10-PCS | Performed by: STUDENT IN AN ORGANIZED HEALTH CARE EDUCATION/TRAINING PROGRAM

## 2024-08-30 PROCEDURE — 81025 URINE PREGNANCY TEST: CPT

## 2024-08-30 PROCEDURE — 88304 TISSUE EXAM BY PATHOLOGIST: CPT | Performed by: STUDENT IN AN ORGANIZED HEALTH CARE EDUCATION/TRAINING PROGRAM

## 2024-08-30 RX ORDER — KETOROLAC TROMETHAMINE 30 MG/ML
INJECTION, SOLUTION INTRAMUSCULAR; INTRAVENOUS AS NEEDED
Status: DISCONTINUED | OUTPATIENT
Start: 2024-08-30 | End: 2024-08-30 | Stop reason: SURG

## 2024-08-30 RX ORDER — HYDROMORPHONE HYDROCHLORIDE 1 MG/ML
0.2 INJECTION, SOLUTION INTRAMUSCULAR; INTRAVENOUS; SUBCUTANEOUS EVERY 5 MIN PRN
Status: DISCONTINUED | OUTPATIENT
Start: 2024-08-30 | End: 2024-08-30

## 2024-08-30 RX ORDER — HYDROCODONE BITARTRATE AND ACETAMINOPHEN 5; 325 MG/1; MG/1
2 TABLET ORAL ONCE AS NEEDED
Status: COMPLETED | OUTPATIENT
Start: 2024-08-30 | End: 2024-08-30

## 2024-08-30 RX ORDER — HYDROMORPHONE HYDROCHLORIDE 1 MG/ML
0.4 INJECTION, SOLUTION INTRAMUSCULAR; INTRAVENOUS; SUBCUTANEOUS EVERY 5 MIN PRN
Status: DISCONTINUED | OUTPATIENT
Start: 2024-08-30 | End: 2024-08-30

## 2024-08-30 RX ORDER — MIDAZOLAM HYDROCHLORIDE 1 MG/ML
1 INJECTION INTRAMUSCULAR; INTRAVENOUS EVERY 5 MIN PRN
Status: DISCONTINUED | OUTPATIENT
Start: 2024-08-30 | End: 2024-08-30

## 2024-08-30 RX ORDER — NALOXONE HYDROCHLORIDE 0.4 MG/ML
0.08 INJECTION, SOLUTION INTRAMUSCULAR; INTRAVENOUS; SUBCUTANEOUS AS NEEDED
Status: DISCONTINUED | OUTPATIENT
Start: 2024-08-30 | End: 2024-08-30

## 2024-08-30 RX ORDER — ONDANSETRON 2 MG/ML
INJECTION INTRAMUSCULAR; INTRAVENOUS AS NEEDED
Status: DISCONTINUED | OUTPATIENT
Start: 2024-08-30 | End: 2024-08-30 | Stop reason: SURG

## 2024-08-30 RX ORDER — HYDROCODONE BITARTRATE AND ACETAMINOPHEN 5; 325 MG/1; MG/1
1 TABLET ORAL ONCE AS NEEDED
Status: COMPLETED | OUTPATIENT
Start: 2024-08-30 | End: 2024-08-30

## 2024-08-30 RX ORDER — PROCHLORPERAZINE EDISYLATE 5 MG/ML
5 INJECTION INTRAMUSCULAR; INTRAVENOUS EVERY 8 HOURS PRN
Status: DISCONTINUED | OUTPATIENT
Start: 2024-08-30 | End: 2024-08-30

## 2024-08-30 RX ORDER — MEPERIDINE HYDROCHLORIDE 25 MG/ML
12.5 INJECTION INTRAMUSCULAR; INTRAVENOUS; SUBCUTANEOUS AS NEEDED
Status: DISCONTINUED | OUTPATIENT
Start: 2024-08-30 | End: 2024-08-30

## 2024-08-30 RX ORDER — CEFAZOLIN SODIUM IN 0.9 % NACL 3 G/100 ML
3 INTRAVENOUS SOLUTION, PIGGYBACK (ML) INTRAVENOUS ONCE
Status: COMPLETED | OUTPATIENT
Start: 2024-08-30 | End: 2024-08-30

## 2024-08-30 RX ORDER — ACETAMINOPHEN 500 MG
1000 TABLET ORAL ONCE
Status: DISCONTINUED | OUTPATIENT
Start: 2024-08-30 | End: 2024-08-30 | Stop reason: HOSPADM

## 2024-08-30 RX ORDER — DEXAMETHASONE SODIUM PHOSPHATE 4 MG/ML
VIAL (ML) INJECTION AS NEEDED
Status: DISCONTINUED | OUTPATIENT
Start: 2024-08-30 | End: 2024-08-30 | Stop reason: SURG

## 2024-08-30 RX ORDER — MIDAZOLAM HYDROCHLORIDE 1 MG/ML
INJECTION INTRAMUSCULAR; INTRAVENOUS AS NEEDED
Status: DISCONTINUED | OUTPATIENT
Start: 2024-08-30 | End: 2024-08-30 | Stop reason: SURG

## 2024-08-30 RX ORDER — BUPIVACAINE HYDROCHLORIDE AND EPINEPHRINE 5; 5 MG/ML; UG/ML
INJECTION, SOLUTION EPIDURAL; INTRACAUDAL; PERINEURAL AS NEEDED
Status: DISCONTINUED | OUTPATIENT
Start: 2024-08-30 | End: 2024-08-30 | Stop reason: HOSPADM

## 2024-08-30 RX ORDER — INDOCYANINE GREEN AND WATER 25 MG
5 KIT INJECTION ONCE
Status: COMPLETED | OUTPATIENT
Start: 2024-08-30 | End: 2024-08-30

## 2024-08-30 RX ORDER — SCOLOPAMINE TRANSDERMAL SYSTEM 1 MG/1
1 PATCH, EXTENDED RELEASE TRANSDERMAL ONCE
Status: DISCONTINUED | OUTPATIENT
Start: 2024-08-30 | End: 2024-08-30

## 2024-08-30 RX ORDER — LIDOCAINE HYDROCHLORIDE 10 MG/ML
INJECTION, SOLUTION EPIDURAL; INFILTRATION; INTRACAUDAL; PERINEURAL AS NEEDED
Status: DISCONTINUED | OUTPATIENT
Start: 2024-08-30 | End: 2024-08-30 | Stop reason: SURG

## 2024-08-30 RX ORDER — SODIUM CHLORIDE, SODIUM LACTATE, POTASSIUM CHLORIDE, CALCIUM CHLORIDE 600; 310; 30; 20 MG/100ML; MG/100ML; MG/100ML; MG/100ML
INJECTION, SOLUTION INTRAVENOUS CONTINUOUS
Status: DISCONTINUED | OUTPATIENT
Start: 2024-08-30 | End: 2024-08-30

## 2024-08-30 RX ORDER — ROCURONIUM BROMIDE 10 MG/ML
INJECTION, SOLUTION INTRAVENOUS AS NEEDED
Status: DISCONTINUED | OUTPATIENT
Start: 2024-08-30 | End: 2024-08-30 | Stop reason: SURG

## 2024-08-30 RX ORDER — LABETALOL HYDROCHLORIDE 5 MG/ML
5 INJECTION, SOLUTION INTRAVENOUS EVERY 5 MIN PRN
Status: DISCONTINUED | OUTPATIENT
Start: 2024-08-30 | End: 2024-08-30

## 2024-08-30 RX ORDER — OXYCODONE HYDROCHLORIDE 5 MG/1
5 TABLET ORAL EVERY 6 HOURS PRN
Qty: 15 TABLET | Refills: 0 | Status: SHIPPED | OUTPATIENT
Start: 2024-08-30

## 2024-08-30 RX ORDER — HYDROMORPHONE HYDROCHLORIDE 1 MG/ML
INJECTION, SOLUTION INTRAMUSCULAR; INTRAVENOUS; SUBCUTANEOUS
Status: COMPLETED
Start: 2024-08-30 | End: 2024-08-30

## 2024-08-30 RX ORDER — HEPARIN SODIUM 5000 [USP'U]/ML
5000 INJECTION, SOLUTION INTRAVENOUS; SUBCUTANEOUS ONCE
Status: COMPLETED | OUTPATIENT
Start: 2024-08-30 | End: 2024-08-30

## 2024-08-30 RX ORDER — ACETAMINOPHEN 500 MG
1000 TABLET ORAL ONCE AS NEEDED
Status: COMPLETED | OUTPATIENT
Start: 2024-08-30 | End: 2024-08-30

## 2024-08-30 RX ORDER — ONDANSETRON 2 MG/ML
4 INJECTION INTRAMUSCULAR; INTRAVENOUS EVERY 6 HOURS PRN
Status: DISCONTINUED | OUTPATIENT
Start: 2024-08-30 | End: 2024-08-30

## 2024-08-30 RX ORDER — HYDROMORPHONE HYDROCHLORIDE 1 MG/ML
0.6 INJECTION, SOLUTION INTRAMUSCULAR; INTRAVENOUS; SUBCUTANEOUS EVERY 5 MIN PRN
Status: DISCONTINUED | OUTPATIENT
Start: 2024-08-30 | End: 2024-08-30

## 2024-08-30 RX ADMIN — ONDANSETRON 4 MG: 2 INJECTION INTRAMUSCULAR; INTRAVENOUS at 08:30:00

## 2024-08-30 RX ADMIN — SODIUM CHLORIDE, SODIUM LACTATE, POTASSIUM CHLORIDE, CALCIUM CHLORIDE: 600; 310; 30; 20 INJECTION, SOLUTION INTRAVENOUS at 09:15:00

## 2024-08-30 RX ADMIN — CEFAZOLIN SODIUM IN 0.9 % NACL 3 G: 3 G/100 ML INTRAVENOUS SOLUTION, PIGGYBACK (ML) INTRAVENOUS at 07:45:00

## 2024-08-30 RX ADMIN — KETOROLAC TROMETHAMINE 30 MG: 30 INJECTION, SOLUTION INTRAMUSCULAR; INTRAVENOUS at 08:44:00

## 2024-08-30 RX ADMIN — LIDOCAINE HYDROCHLORIDE 30 MG: 10 INJECTION, SOLUTION EPIDURAL; INFILTRATION; INTRACAUDAL; PERINEURAL at 07:42:00

## 2024-08-30 RX ADMIN — SODIUM CHLORIDE, SODIUM LACTATE, POTASSIUM CHLORIDE, CALCIUM CHLORIDE: 600; 310; 30; 20 INJECTION, SOLUTION INTRAVENOUS at 07:37:00

## 2024-08-30 RX ADMIN — ROCURONIUM BROMIDE 50 MG: 10 INJECTION, SOLUTION INTRAVENOUS at 07:42:00

## 2024-08-30 RX ADMIN — MIDAZOLAM HYDROCHLORIDE 2 MG: 1 INJECTION INTRAMUSCULAR; INTRAVENOUS at 07:37:00

## 2024-08-30 RX ADMIN — DEXAMETHASONE SODIUM PHOSPHATE 8 MG: 4 MG/ML VIAL (ML) INJECTION at 07:51:00

## 2024-08-30 NOTE — DISCHARGE INSTRUCTIONS
Home Care Instructions  Cholecystectomy  Hao Piper MD      WHAT TO EXPECT  You may feel pain at the incisions. This is due to stitches placed during the surgery.    You may feel pain in the shoulders. This is due to irritation of the diaphragm by the air used to inflate the abdomen.    You may feel a sore throat. This is due to the breathing tube used during surgery.     You may feel mild nausea and vomiting for the first 24 hours, this should resolve quickly.    You may have constipation, especially if taking narcotic pain medications. If you have not had a bowel movement by 48 hours after surgery, take Miralax 17g (one cap full) every 12 hours until you have a bowel movement. If another 24 hours goes by without a bowel movement, then take a dose of magnesium citrate or milk of magnesia.     MEDICATIONS  Take 2 Extra Strength Tylenol (1000mg every) 8 hours for pain. For the first 3 days it is best to take the Tylenol every 8 hours even if you do not feel much pain.     For moderate to severe pain take one Oxycodone pill (5mg) or Norco (325/5mg) every six hours as needed for pain. If you do not feel that narcotics are necessary you shouldn’t take them. If the pain is severe you can take two pills (10mg) every six hours.    You can take Advil (ibuprofen) 800mg every 8 hours or Alleve (naproxen) 500mg every 12 hours.     You received a drug called Toradol which is an Anti Inflammatory at: 8:45 AM   If you are allowed to take Anti inflammatories:    Do not take any Anti Inflammatory like Motrin, Aleve or Ibuprophen until after: 2:45 PM  Please report any suspected allergic reactions or bleeding issues to your doctor     Please ask your surgeon before resuming blood thinners such as Aspirin, Plavix, Coumadin, Warfarin, Eliquis, or Xarelto. All other home medications may be resumed as scheduled.    Norco contains acetaminophen which is the active ingredient in Tylenol. Do not exceed the recommended dose of 4000mg  of Acetaminophen within 24 hours from all sources (norco and tylenol).    DIET  Start with a light and bland diet and slowly advance to regular food as your appetite improves. There are no specific food restrictions. Do not eat excessively. Eat small frequent meals.     Drink plenty of water. Try to eat a healthy high fiber diet.    Do not drink alcohol (beer, wine, liquor) or use tobacco products.    WOUND CARE  You can shower 24 hours after surgery and get the dressings wet.    The skin glue will stay on for 10 to 14 days after surgery.     Soap and water can get on the incisions but do not scrub the wounds. No hair dye or chemicals of any kind should get on the incisions.     Do not apply any topical ointments such as Neosporin or Hydrogen Peroxide.    Do not swim or submerge the incisions under water for 1 month.    ACTIVITY  Every day you should be up walking around the house. Do not lie in bed all day. Staying active prevents blood clots and pneumonia.    You can go up and down stairs. Do not lift more than 20 pounds or perform strenuous activity that requires straining the core muscles.    You may ride in a car but should not drive the car for at least one week.     APPOINTMENT  Please call our office at [phone] soon to make an appointment.    For questions or concerns please call our office between 8:30 a.m. and 5 p.m. Monday through Friday. The number above directs to the answering service after hours to reach the on-call physician.    Please call our office immediately for fever greater than 100.5, excess bleeding, inability to urinate, severe abdominal pain, severe diarrhea, uncontrollable vomiting.      For life threatening emergencies such as severe chest pain, difficulty breathing, or loss of conciousness call 911.

## 2024-08-30 NOTE — ANESTHESIA POSTPROCEDURE EVALUATION
[facility]    Debbie Somers Patient Status:  Hospital Outpatient Surgery   Age/Gender 44 year old female MRN [id number]   Location [facility] SURGERY Attending Hao Piper MD   Hosp Day # 0 PCP No primary care provider on file.       Anesthesia Post-op Note    ROBOTIC LAPAROSCOPIC CHOLECYSTECTOMY   WITH INJECTION OF  INDOCYANINE GREEN    Procedure Summary       Date: 08/30/24 Room / Location:  MAIN OR  /  MAIN OR    Anesthesia Start: 0735 Anesthesia Stop: 0915    Procedure: ROBOTIC LAPAROSCOPIC CHOLECYSTECTOMY   WITH INJECTION OF  INDOCYANINE GREEN Diagnosis:       Biliary colic      (Biliary colic [K80.50])    Surgeons: Hao Piper MD Anesthesiologist: Jonh Alejandra MD    Anesthesia Type: general ASA Status: 3            Anesthesia Type: general    Vitals Value Taken Time   /99 08/30/24 0918   Temp 99.0 08/30/24 0918   Pulse 98 08/30/24 0918   Resp 16 08/30/24 0918   SpO2 95 08/30/24 0918       Patient Location: PACU    Anesthesia Type: general    Airway Patency: extubated    Postop Pain Control: adequate    Mental Status: mildly sedated but able to meaningfully participate in the post-anesthesia evaluation    Nausea/Vomiting: none    Cardiopulmonary/Hydration status: stable euvolemic    Complications: no apparent anesthesia related complications    Postop vital signs: stable    Dental Exam: Unchanged from Preop

## 2024-08-30 NOTE — ANESTHESIA PROCEDURE NOTES
Airway  Date/Time: 8/30/2024 7:43 AM  Urgency: elective    Airway not difficult    General Information and Staff    Patient location during procedure: OR  Anesthesiologist: Jonh Alejandra MD  Performed: anesthesiologist   Performed by: Jonh Alejanrda MD  Authorized by: Jonh Alejandra MD      Indications and Patient Condition  Indications for airway management: anesthesia  Sedation level: deep  Preoxygenated: yes  Patient position: sniffing  Mask difficulty assessment: 1 - vent by mask    Final Airway Details  Final airway type: endotracheal airway      Successful airway: ETT  Cuffed: yes   Successful intubation technique: direct laryngoscopy  Endotracheal tube insertion site: oral  Blade: Mary Ellen  Blade size: #3  ETT size (mm): 7.5    Cormack-Lehane Classification: grade IIA - partial view of glottis  Placement verified by: capnometry   Measured from: teeth  ETT to teeth (cm): 20  Number of attempts at approach: 1

## 2024-08-30 NOTE — H&P
New Patient Visit Note     Active Problems      1. Calculus of gallbladder without cholecystitis without obstruction          Chief Complaint        Chief Complaint   Patient presents with    ER F/U       Follow up from ER seen on  6/20/2024 for Biliary coli patient states she was having abdominal pain near rib cage and nausea no vomiting          History of Present Illness      Debbie Somers is a 44-year-old female who presents to clinic following ER evaluation for biliary colic.     On 6/19/2024, the patient reports experiencing sudden onset of constant right upper quadrant abdominal pain.  At first, she attributed her discomfort to MSK pain. She reports some improvement with over the counter analgesics.     The next day, she reports eating yogurt with granola for breakfast.  A few minutes later, she started to experience right upper quadrant abdominal pain radiating throughout her upper abdomen.  She reports associated nausea, denies vomiting.  She denies fevers or chills.  She denies changes in her bowel movements.  She presented to [facility] for further evaluation.     She underwent abdominal ultrasound which revealed cholelithiasis.  No gallbladder wall thickening.  No significant biliary ductal dilation.  The common bile duct measures 3 mm.  She had no leukocytosis and liver enzymes were within normal limits.  She was ultimately discharged home with general surgery follow-up and acetaminophen codeine.     Currently, the patient denies symptoms. She has not taken the acetaminophen codeine. She reports taking 1 ibuprofen a few days ago. She has made some dietary changes and reports improvement in her abdominal pain.      The patient has a significant past surgical history of laparoscopic ovarian cystectomy in November 2011.  She has a significant past medical history of Hashimoto's and prediabetes.  She denies taking blood thinning medications.     Allergies  Debbie is allergic to dust mite extract and  levofloxacin.     Past Medical / Surgical / Social / Family History    The past medical and past surgical history have been reviewed by me today.     Past Medical History       Past Medical History:    Hypothyroid    Nephrolithiasis         Past Surgical History         Past Surgical History:   Procedure Laterality Date    Maddy biopsy stereo nodule 1 site right (cpt=19081)   05/25/2022     top hat clip asymmetry    Other         ovarian dermoid cyst            The family history and social history have been reviewed by me today.     Family History         Family History   Problem Relation Age of Onset    Other (hypothyroid) Mother      Other (dm) Father      Other (bladder cancer) Maternal Grandmother      Other (lung cancer) Paternal Grandmother           Social Hx on file   Social History            Socioeconomic History    Marital status: Single   Tobacco Use    Smoking status: Never    Smokeless tobacco: Never   Vaping Use    Vaping status: Never Used   Substance and Sexual Activity    Alcohol use: Yes       Comment: 1 drink/ month    Drug use: Never           Medications - Current      Current Outpatient Medications:     buPROPion  MG Oral Tablet 24 Hr, Take 1 tablet (150 mg total) by mouth every morning., Disp: , Rfl:     levothyroxine 112 MCG Oral Tab, Take 1 tablet (112 mcg total) by mouth before breakfast., Disp: , Rfl:     liothyronine 25 MCG Oral Tab, Take 1 tablet (25 mcg total) by mouth., Disp: , Rfl:     SEMAGLUTIDE OR, INJECT 25 UNITS (0.25ML OR 0.25MG) SUBCUTANEOUSLY ONCE A WEEK FOR 4 WEEKS THEN 50 UNITS (0.5ML OR 0.5MG) SUBCUTANEOUSLY ONCE A WEEK FOR 4 WEEKS +SYRINGE KIT (10) PLEASE ALLOW 48 TO 72 HOURS FOR REFILLS, Disp: , Rfl:          Review of Systems  The Review of Systems has been reviewed by me during today.  Review of Systems   Constitutional:  Negative for chills, diaphoresis, fatigue, fever and unexpected weight change.   HENT:  Negative for hearing loss, nosebleeds, sore throat and  trouble swallowing.    Respiratory:  Negative for apnea, cough, shortness of breath and wheezing.    Cardiovascular:  Negative for chest pain, palpitations and leg swelling.   Gastrointestinal:  Negative for abdominal distention, abdominal pain, anal bleeding, blood in stool, constipation, diarrhea, nausea and vomiting.   Genitourinary:  Negative for difficulty urinating, dysuria, frequency and urgency.   Musculoskeletal:  Negative for arthralgias and myalgias.   Skin:  Negative for color change and rash.   Neurological:  Negative for tremors, syncope and weakness.   Hematological:  Negative for adenopathy. Does not bruise/bleed easily.   Psychiatric/Behavioral:  Negative for behavioral problems and sleep disturbance.          Physical Findings   Physical Exam  Constitutional:       Appearance: Normal appearance.   HENT:      Head: Normocephalic and atraumatic.   Eyes:      Extraocular Movements: Extraocular movements intact.      Pupils: Pupils are equal, round, and reactive to light.   Cardiovascular:      Rate and Rhythm: Normal rate and regular rhythm.   Pulmonary:      Effort: Pulmonary effort is normal. No respiratory distress.   Abdominal:      General: Abdomen is flat. Bowel sounds are normal. There is no distension.      Palpations: Abdomen is soft. There is no mass.      Tenderness: There is no abdominal tenderness. There is no guarding or rebound.   Musculoskeletal:         General: Normal range of motion.      Cervical back: Normal range of motion.   Skin:     General: Skin is warm.      Coloration: Skin is not jaundiced or pale.      Findings: No erythema.   Neurological:      General: No focal deficit present.      Mental Status: She is alert and oriented to person, place, and time.            Assessment   1. Calculus of gallbladder without cholecystitis without obstruction    44 year old female presented to the ER with signs and symptoms of cholelithiasis. I reviewed her ultrasound and her LFTs and  CBC. There is evidence of cholelithiasis without signs of cholecystitis on CT scan. No leukocytosis. Liver enzymes are within normal limits and common bile duct has a normal caliber. Discussed with her the pathophysiology of gallbladder disease and the treatment options. She is leaning towards surgical removal of her gallbladder. I explained in detail the risks and benefits and the operative intervention and post operative recovery. Patient understands and agrees to proceed. She is here for planned procedure.  Hao Piper MD

## 2024-08-30 NOTE — OPERATIVE REPORT
[facility]  Operative Note    Debbie Somers Location: OR   [facility] [id number] MRN [id number]    1980 Age 44 year old   Admission Date 2024 Operation Date 2024   Attending Physician Hao Piper MD Operating Physician Hao Piper MD   PCP No primary care provider on file.        Patient Name: Debbie Somers    Preoperative Diagnosis: Biliary colic [K80.50]    Postoperative Diagnosis: Same as pre-op diagnosis.    Primary Surgeon: Hao Piper MD     Assistant: Melvi Kaufman PA-C    Anesthesia: General    Anesthesiologist: Anesthesiologist.: Jonh Alejandra MD    Procedures: Robot-assisted Laparoscopic Cholecystectomy with ICG injection    Implants: None    Specimen: Gallbladder    Drains: None    Estimated Blood Loss: 10 mL     Complications: none    Condition: Good    Indications for Surgery:   Debbie Somers is a 44 year old female who presents with progressive epigastric and right upper quadrant abdominal pain. Work-up revealed cholelithiasis . The patient presents today for laparoscopic cholecystectomy.    Surgical Findings:   cholelithiasis    Description of Procedure:   The patient was transported to the operating room and placed on the operating table in supine position.General endotracheal anesthesia was administered. Preoperative antibiotics were given. The abdomen was prepped and draped in sterile fashion. A time-out was performed.    A stab incision was made in the left upper quadrant 2 cms below the costal margin at the mid clavicular line. The Veress needle was introduced into the abdominal cavity and pneumoperitoneum was achieved to a pressure of 15 mmHg.   An 8 mm periumbilical incision was made.   An 8 mm trocar was placed through this incision with a 5 mm laparoscope visualizing the abdominal wall layers during entry.  Upon initial inspection of the abdominal cavity there was no injury from our entry. There was no unexpected abnormality of the visible  abdominal organs.  Three additional 8 mm trochars were placed in the mid right abdomen, right flank and left upper quadrant . Patient was placed in reverse Trendelenburg position and right side up.  The robot was docked. Instruments were placed under direct visualization.  Attention was turned to the right upper quadrant. The gallbladder dome was grasped and elevated,. The infundibulum was retracted. Indocyanine green had been injected preoperatively and firefly was used to confirm the location of the cystic duct and common bile duct. Dissection was initiated at the triangle of Calot.  After clearing the peritoneum and connective tissue the cystic duct and cystic artery were identified superior to the line of Rouviere. The infundibulum was dissected away from the liver bed. The critical view of safety was obtained.  Next, a clip was placed on the specimen side of the cystic duct and two clips were placed on the patient's side of the cystic duct. The duct was divided. Next, one clip was placed on the cystic artery on the specimen side, two towards the patient side, and the cystic artery was divided with the Endoshears. The gallbladder was elevated from the gallbladder fossa using electrocautery. Once the gallbladder was dissected from the gallbladder fossa it was placed in an endocatch specimen bag and set aside.   Hemostasis on the gallbladder fossa was achieved with electrocautery. The right upper quadrant was then irrigated until the effluent fluid was clear.  The previously placed clips on the cystic duct and cystic artery were visualized and inspected; they were well approximated, well situated, and there was no evidence of active bleeding or bile leak. The specimen was then extracted from the umbilical trocar site.  Pneumoperitoneum was released and trocars removed. The fascia at the umbilicus was reapproximated using #1 PDS suture. All skin incisions were cleansed, irrigated, and injected with local  anesthetic. Skin incisions were reapproximated using subcuticular 4-0 monocryl suture.  Skin glue was used to seal all the incisions.  The patient was awakened from anesthesia and brought to the recovery room in good condition. The patient tolerated the procedure well without apparent complication. All sponge, needle, and instrument counts were correct at the end of the case.  Hao Piper MD   8/30/2024  9:06 AM

## 2024-09-12 ENCOUNTER — OFFICE VISIT (OUTPATIENT)
Facility: LOCATION | Age: 44
End: 2024-09-12
Payer: COMMERCIAL

## 2024-09-12 VITALS
RESPIRATION RATE: 16 BRPM | HEART RATE: 91 BPM | OXYGEN SATURATION: 95 % | TEMPERATURE: 98 F | BODY MASS INDEX: 49 KG/M2 | HEIGHT: 68 IN

## 2024-09-12 DIAGNOSIS — Z09 POSTOP CHECK: Primary | ICD-10-CM

## 2024-09-12 PROCEDURE — 99024 POSTOP FOLLOW-UP VISIT: CPT | Performed by: PHYSICIAN ASSISTANT

## 2024-09-12 RX ORDER — BUPROPION HYDROCHLORIDE 300 MG/1
300 TABLET ORAL
COMMUNITY
Start: 2024-09-04

## 2024-09-12 NOTE — PROGRESS NOTES
Post Operative Visit Note       Active Problems  1. Postop check         Chief Complaint   Chief Complaint   Patient presents with    Post-Op     8/30/24 ROBOTIC LAPAROSCOPIC CHOLECYSTECTOMY   WITH INJECTION OF  INDOCYANINE GREEN - Pt is still having intermittent sharp pain to the left of the umbilical area. Pt had a severe episode of pain 2d ago. Pain was in the chest/abodminal/back area that lasted for 1.5hrs.           History of Present Illness   44 year old female presents for postop visit following laparoscopic cholecystectomy on 8/30/2024 with Dr. Piper.  She reports she overall had been recovering well.  On Tuesday (2 days ago) she developed acute onset of sharp upper abdominal pain that lasted for approximately 1.5 hours.  She states that she was driving to the ER to be evaluated when her pain resolved, thus she ultimately decided to drive back home.  She took a Gas-X later that night which may have helped her symptoms.  She had some mild nausea yesterday morning, though otherwise states she is feeling much better.  She had a large bowel movement last night.  She denies any abdominal pain today.  She is tolerating a diet without nausea or vomiting.  She states that the episode of pain she had on Tuesday felt similar to an episode of her preoperative gallstone pain.  She denies any fevers or chills.          Allergies  Debbie is allergic to dust mite extract and levofloxacin.    Past Medical / Surgical / Social / Family History    The past medical and past surgical history have been reviewed by me today.     Past Medical History:    Anxiety state    Depression    Disorder of thyroid    Hx of motion sickness    Hypothyroid    Nephrolithiasis    Visual impairment    GLASSES FOR DRIVING     Past Surgical History:   Procedure Laterality Date    Maddy biopsy stereo nodule 1 site right (cpt=19081)  05/25/2022    top hat clip asymmetry    Other      ovarian dermoid cyst       The family history and social  history have been reviewed by me today.    Family History   Problem Relation Age of Onset    Other (hypothyroid) Mother     Other (dm) Father     Other (bladder cancer) Maternal Grandmother     Other (lung cancer) Paternal Grandmother      Social History     Socioeconomic History    Marital status: Single   Tobacco Use    Smoking status: Never    Smokeless tobacco: Never   Vaping Use    Vaping status: Never Used   Substance and Sexual Activity    Alcohol use: Yes     Comment: 1 drink/month    Drug use: Never        Current Outpatient Medications:     buPROPion  MG Oral Tablet 24 Hr, 1 tablet (300 mg total)., Disp: , Rfl:     ergocalciferol 1.25 MG (86578 UT) Oral Cap, Take by mouth every 7 days., Disp: , Rfl:     levothyroxine 112 MCG Oral Tab, Take 1 tablet (112 mcg total) by mouth before breakfast., Disp: , Rfl:     liothyronine 25 MCG Oral Tab, Take 1 tablet (25 mcg total) by mouth., Disp: , Rfl:     escitalopram 10 MG Oral Tab, Take 1 tablet (10 mg total) by mouth daily. (Patient not taking: Reported on 9/12/2024), Disp: , Rfl:       Review of Systems  A 10 point Review of Systems has been completed by me today and is negative except as above in the HPI.    Physical Findings   Pulse 91   Temp 97.8 °F (36.6 °C) (Temporal)   Resp 16   Ht 68\"   LMP 08/08/2024 (Exact Date)   SpO2 95%   BMI 48.50 kg/m²   Gen/psych: alert and oriented, cooperative, no apparent distress  Cardiovascular: regular rate  Respiratory: respirations unlabored, no wheeze  Abdominal: soft, non-tender, non-distended, no guarding/rebound  Incisions: Clean, dry, intact, no erythema, no drainage.  Umbilical incision with slight superficial opening to left lateral aspect with overlying exudate         Assessment/Plan  1. Postop check        44 year old female presents for postop visit following laparoscopic cholecystectomy on 8/30/2024 with Dr. Piper.      The patient is overall doing well postoperatively  Etiology of pain she felt  on Tuesday not entirely clear, though this has since resolved and she is pain-free today.  Her symptoms may have been secondary to gas pain or constipation.  She took a Gas-X Tuesday evening and had a large bowel movement yesterday with improvement in symptoms.  Given patient's exam is reassuring today without any abdominal pain, will hold on any additional orders at this time and continue to observe.  We did discuss that if her pain were to return she should call our clinic or present to an emergency department for further evaluation and management  She may continue with a diet as tolerated.  Recommend slowly incorporating fatty foods.  She may use MiraLAX as needed for constipation.  The patient is to continue with lifting restrictions of no more than 15 pounds for 6 weeks from the date of the surgery.  Surgical pathology was discussed with the patient and consistent with cholesterolosis and cholelithiasis.  All questions and concerns were answered.  A note was provided for work.  The patient is return to the clinic as needed.           Imaging & Referrals   None    Follow Up  Return if symptoms worsen or fail to improve.    Yashira Sawant PA-C  [facility]  9/12/2024  11:31 AM

## 2024-11-21 ENCOUNTER — OFFICE VISIT (OUTPATIENT)
Dept: SURGERY | Facility: CLINIC | Age: 44
End: 2024-11-21
Payer: COMMERCIAL

## 2024-11-21 VITALS
HEART RATE: 87 BPM | SYSTOLIC BLOOD PRESSURE: 126 MMHG | OXYGEN SATURATION: 99 % | DIASTOLIC BLOOD PRESSURE: 68 MMHG | WEIGHT: 293 LBS | HEIGHT: 68.1 IN | BODY MASS INDEX: 44.41 KG/M2

## 2024-11-21 DIAGNOSIS — F43.9 STRESS: ICD-10-CM

## 2024-11-21 DIAGNOSIS — R41.840 ATTENTION OR CONCENTRATION DEFICIT: ICD-10-CM

## 2024-11-21 DIAGNOSIS — E66.01 MORBID OBESITY WITH BMI OF 45.0-49.9, ADULT (HCC): ICD-10-CM

## 2024-11-21 DIAGNOSIS — E78.5 DYSLIPIDEMIA: Primary | ICD-10-CM

## 2024-11-21 PROCEDURE — 3008F BODY MASS INDEX DOCD: CPT | Performed by: INTERNAL MEDICINE

## 2024-11-21 PROCEDURE — 3078F DIAST BP <80 MM HG: CPT | Performed by: INTERNAL MEDICINE

## 2024-11-21 PROCEDURE — 3074F SYST BP LT 130 MM HG: CPT | Performed by: INTERNAL MEDICINE

## 2024-11-21 PROCEDURE — 99205 OFFICE O/P NEW HI 60 MIN: CPT | Performed by: INTERNAL MEDICINE

## 2024-11-21 RX ORDER — LISDEXAMFETAMINE DIMESYLATE 20 MG/1
20 CAPSULE ORAL DAILY
Qty: 30 CAPSULE | Refills: 0 | Status: SHIPPED | OUTPATIENT
Start: 2025-01-22 | End: 2025-02-21

## 2024-11-21 RX ORDER — LISDEXAMFETAMINE DIMESYLATE 20 MG/1
20 CAPSULE ORAL DAILY
Qty: 30 CAPSULE | Refills: 0 | Status: SHIPPED | OUTPATIENT
Start: 2024-12-22 | End: 2025-01-21

## 2024-11-21 RX ORDER — LISDEXAMFETAMINE DIMESYLATE 20 MG/1
20 CAPSULE ORAL DAILY
Qty: 30 CAPSULE | Refills: 0 | Status: SHIPPED | OUTPATIENT
Start: 2024-11-21 | End: 2024-12-21

## 2024-11-21 NOTE — PROGRESS NOTES
[facility] Consultation Note       Patient:  Debbie Somers  :      1980  MRN:      [id number]    Referring Provider: Dr. Woodard       Chief Complaint:    Chief Complaint   Patient presents with    Consult    Weight Management       SUBJECTIVE     History of Present Illness:  Debbie Somers has been referred to me for evaluation and treatment.       45 yo who lives with bf  Principal of a school  Currently at heaviest weight   She does the cooking    Patient has tried several diets in the past including exercises and is frustrated with increase of weight. Weight has been a struggle for the past several years and is now starting to develop into co-morbidities that are worrisome to the patient. Patient is interested in losing weight, so it can stay off long term.    Patient also understands that this is a life style change and wants to get on track.    Wants to hear options    Past Medical History:   Past Medical History:    Anxiety state    Depression    Disorder of thyroid    Dyslipidemia    Hx of motion sickness    Hypothyroid    Morbid obesity with BMI of 45.0-49.9, adult (HCC)    Nephrolithiasis    Visual impairment    GLASSES FOR DRIVING       OBJECTIVE     Vitals: /68   Pulse 87   Ht 5' 8.1\" (1.73 m)   Wt (!) 319 lb 3.2 oz (144.8 kg)   LMP 2024 (Exact Date)   SpO2 99%   BMI 48.39 kg/m²      Patient Medications:    Current Outpatient Medications   Medication Sig Dispense Refill    buPROPion  MG Oral Tablet 24 Hr 1 tablet (300 mg total).      ergocalciferol 1.25 MG (20744 UT) Oral Cap Take by mouth every 7 days.      levothyroxine 112 MCG Oral Tab Take 1 tablet (112 mcg total) by mouth before breakfast.      liothyronine 25 MCG Oral Tab Take 1 tablet (25 mcg total) by mouth.         Allergies:  Levofloxacin     Comorbidities:  dyslidemia     Social History:    Social History     Socioeconomic History    Marital status: Single     Spouse name: Not on  file    Number of children: Not on file    Years of education: Not on file    Highest education level: Not on file   Occupational History    Not on file   Tobacco Use    Smoking status: Never    Smokeless tobacco: Never   Vaping Use    Vaping status: Never Used   Substance and Sexual Activity    Alcohol use: Yes     Comment: 1 drink/month    Drug use: Never    Sexual activity: Not on file   Other Topics Concern    Not on file   Social History Narrative    Not on file     Social Drivers of Health     Financial Resource Strain: Not on file   Food Insecurity: Not on file   Transportation Needs: Not on file   Physical Activity: Not on file   Stress: Not on file   Social Connections: Unknown (3/13/2021)    Received from [facility]    Social Connections     Conversations with friends/family/neighbors per week: Not on file   Housing Stability: Low Risk  (7/17/2021)    Received from [facility]    Housing Stability     Mortgage Payment Concerns?: Not on file     Number of Places Lived in the Last Year: Not on file     Unstable Housing?: Not on file     Surgical History:    Past Surgical History:   Procedure Laterality Date    Maddy biopsy stereo nodule 1 site right (cpt=19081)  05/25/2022    top hat clip asymmetry    Other      ovarian dermoid cyst       Family History:    Family History   Problem Relation Age of Onset    Other (hypothyroid) Mother     Other (dm) Father     Other (bladder cancer) Maternal Grandmother     Other (lung cancer) Paternal Grandmother            Typical Dietary Intake:  Breakfast AM Snack Lunch PM Snack Dinner   Coffee, creamer, mcmuffin Nuts 1-2 Salad, water  Chicken, ground beef, broccoli, potatoes   +portions    Soda Drinker?: No  If yes, how much?:      Number of restaurant or fast food meals/week:  6 meals/week    Nutritional Goals Reviewed and Discussed:     Limit carbohydrates to 100 gms per day, Eat 100-200 calories within 1 hour of waking up, and Eat  3-4 cups of fresh fruit or vegetables daily    Behavior Modifications Reviewed and Discussed:    Eat breakfast, Eat 3 meals per day, Plan meals in advance, Read nutrition labels, Drink 64oz of water per day, Maintain a daily food journal, No drinking 30 minutes before or after meals, Utilize portion control strategies to reduce calorie intake, Identify triggers for eating and manage cues, and Eat slowly and take 20 to 30 minutes to complete each meal      ROS:  Constitutional: positive for fatigue  Respiratory: negative  Cardiovascular: negative  Gastrointestinal: negative  Musculoskeletal:positive for back pain  Neurological: negative  Behavioral/Psych: positive for stress  Endocrine: negative  All other systems were reviewed and are negative.    Physical Exam:  General appearance: alert, appears stated age, cooperative, and moderately obese  Head: Normocephalic, without obvious abnormality, atraumatic  Back: symmetric, no curvature. ROM normal. No CVA tenderness.  Lungs: clear to auscultation bilaterally  Heart: S1, S2 normal, no murmur, click, rub or gallop, regular rate and rhythm  Abdomen:  soft, obese, non tender  Extremities: extremities normal, atraumatic, no cyanosis or edema  Pulses: 2+ and symmetric  Skin: Skin color, texture, turgor normal. No rashes or lesions  Neurologic: Grossly normal    ASSESSMENT     HYPERCHOLESTEROLEMIA:  The patient states that her cholesterol has been well controlled on her current medication.    Lab Results   Component Value Date/Time    CHOLEST 201 (H) 07/28/2021 03:20 PM     (H) 07/28/2021 03:20 PM    HDL 35 (L) 07/28/2021 03:20 PM    TRIG 188 (H) 07/28/2021 03:20 PM    VLDL 34 (H) 07/28/2021 03:20 PM         Encounter Diagnosis(ses):   1. Dyslipidemia    2. Stress    3. Morbid obesity with BMI of 45.0-49.9, adult (HCC)        PLAN     Patient is not interested in bariatric surgery. Patient desires to pursue traditional weight loss at this time.      DYSLIPIDEMIA:  Stable on the above prescribed meal plan and medication. Liver function stable.    Lab Results   Component Value Date/Time    CHOLEST 201 (H) 07/28/2021 03:20 PM     (H) 07/28/2021 03:20 PM    HDL 35 (L) 07/28/2021 03:20 PM    TRIG 188 (H) 07/28/2021 03:20 PM    VLDL 34 (H) 07/28/2021 03:20 PM     .c  Goals for next month:  1. Keep a food log.  2. Drink 48-64 ounces of non-caloric beverages per day. No fruit juices or regular soda.  3. Increase activity-upper body exercises, walk 10 minutes per day.  4. Increase fruit and vegetable servings to 5-6 per day.      Has tried GLP in the past and did well.   Some anxiety with needles    Will start Vyvanse 20 mg  EKG done    Compound semaglutide is a custom-made medication that mimics the GLP-1 hormone. It is used to treat type 2 diabetes and lower the risk of heart or blood vessel disease. It works by increasing insulin release, lowering glucagon release, delaying gastric emptying and reducing appetite. Compound semaglutide is prescribed when an FDA-approved medication, dose, or dosage form is unavailable (ie. Nationwide shortage or no obesity coverage for GLP-1 meds). Patients are aware of the difference between these medications.  Cost of these medications is  dollars monthly based on dose.  Will restart at 0.25 mg          Diagnoses and all orders for this visit:    Dyslipidemia    Stress    Morbid obesity with BMI of 45.0-49.9, adult (HCC)        Sukhwinder Martinez MD

## 2025-01-29 ENCOUNTER — OFFICE VISIT (OUTPATIENT)
Dept: FAMILY MEDICINE CLINIC | Facility: CLINIC | Age: 45
End: 2025-01-29
Payer: COMMERCIAL

## 2025-01-29 VITALS
HEART RATE: 80 BPM | SYSTOLIC BLOOD PRESSURE: 112 MMHG | DIASTOLIC BLOOD PRESSURE: 79 MMHG | WEIGHT: 293 LBS | HEIGHT: 67.8 IN | BODY MASS INDEX: 44.92 KG/M2 | TEMPERATURE: 97 F

## 2025-01-29 DIAGNOSIS — E66.01 MORBID OBESITY WITH BMI OF 45.0-49.9, ADULT (HCC): ICD-10-CM

## 2025-01-29 DIAGNOSIS — E55.9 VITAMIN D DEFICIENCY: ICD-10-CM

## 2025-01-29 DIAGNOSIS — Z87.19 HISTORY OF PANCREATITIS: ICD-10-CM

## 2025-01-29 DIAGNOSIS — E06.3 HYPOTHYROIDISM DUE TO HASHIMOTO THYROIDITIS: Primary | ICD-10-CM

## 2025-01-29 PROCEDURE — 99214 OFFICE O/P EST MOD 30 MIN: CPT | Performed by: FAMILY MEDICINE

## 2025-01-29 PROCEDURE — 3008F BODY MASS INDEX DOCD: CPT | Performed by: FAMILY MEDICINE

## 2025-01-29 PROCEDURE — 3074F SYST BP LT 130 MM HG: CPT | Performed by: FAMILY MEDICINE

## 2025-01-29 PROCEDURE — 3078F DIAST BP <80 MM HG: CPT | Performed by: FAMILY MEDICINE

## 2025-01-29 NOTE — PROGRESS NOTES
HPI:    Patient ID: Debbie Somers is a 44 year old female.      HPI    Chief Complaint   Patient presents with    Establish Care     Referred by Dr. Martinez needs refills on thyroid medication and vitamin D     [facility] F/U     For pancreatitis        Wt Readings from Last 6 Encounters:   01/29/25 295 lb (133.8 kg)   11/21/24 (!) 319 lb 3.2 oz (144.8 kg)   08/30/24 (!) 319 lb (144.7 kg)   07/15/24 (!) 314 lb (142.4 kg)   07/02/24 (!) 315 lb (142.9 kg)   06/20/24 (!) 315 lb (142.9 kg)     BP Readings from Last 3 Encounters:   01/29/25 112/79   11/21/24 126/68   08/30/24 125/84     New  patient to establish care  Patient was referred by Dr. Martinez.  She sees them for weight management.  Patient states about 6 months ago she was started on semaglutide     Patient gives a brief history stating in June 2024 she developed abdominal pain and ultrasound showed gallstones without evidence of obstruction or acute cholecystitis.  Symptoms were consistent with acute biliary colic.  She was also noted to have fatty liver.    She was discharged home to follow-up with general surgery and subsequently had surgery August 30 for cholecystectomy    She continued with weight management and was started on Vyvanse  end of November      Patient states she then developed sharp pain in her upper abdomen and was admitted at [facility] on December 8 and diagnosed with acute pancreatitis.  Etiology was not found.  Her triglycerides were normal.  Patient is on alcohol drinker  Patient was not on semaglutide at that time.    MRI abdomen without and with IV contrast showed pancreatic edema and peripancreatic fat stranding consistent with pancreatitis.  No lesions were noted in the pancreas.  There was no intrahepatic or extrahepatic biliary ductal dilatation  Patient states he was admitted for 1 week and then eventually discharged home patient states she has been doing well    Patient works as a principal at a school in [place]  She has a  long commute as [place] in the city    She was on thyroid medication and she has a history of thyroiditis.  She states she has been off her thyroid medication since October as she has not been able to get in to see an endocrinologist.    Emergency room reports/ tests/ discharge summary/ CT/ MRi reports all reviewed              Review of Systems   Constitutional:  Negative for chills and fever.   Respiratory:  Negative for cough and shortness of breath.    Gastrointestinal:  Negative for abdominal pain, constipation, diarrhea, nausea and vomiting.   Endocrine: Negative for cold intolerance, heat intolerance, polydipsia, polyphagia and polyuria.   Genitourinary:  Negative for difficulty urinating and dysuria.   Psychiatric/Behavioral:  Negative for behavioral problems.        /79   Pulse 80   Temp 97.1 °F (36.2 °C) (Temporal)   Ht 5' 7.8\" (1.722 m)   Wt 295 lb (133.8 kg)   LMP 01/15/2025 (Exact Date)   BMI 45.12 kg/m²          Current Outpatient Medications   Medication Sig Dispense Refill    lisdexamfetamine (VYVANSE) 20 MG Oral Cap Take 1 capsule (20 mg total) by mouth daily. 30 capsule 0     Allergies:Allergies[1]   PHYSICAL EXAM:     Chief Complaint   Patient presents with    Establish Care     Referred by Dr. Martinez needs refills on thyroid medication and vitamin D     Hospital F/U     For pancreatitis       Physical Exam  Vitals reviewed.   Constitutional:       Appearance: She is obese.   Cardiovascular:      Rate and Rhythm: Normal rate and regular rhythm.      Pulses: Normal pulses.      Heart sounds: Normal heart sounds.   Pulmonary:      Effort: Pulmonary effort is normal.      Breath sounds: Normal breath sounds.   Abdominal:      General: There is no distension.      Tenderness: There is no abdominal tenderness. There is no guarding or rebound.      Hernia: No hernia is present.   Musculoskeletal:      Cervical back: Normal range of motion and neck supple. No tenderness.   Neurological:       Mental Status: She is alert.   Psychiatric:         Mood and Affect: Mood normal.         Behavior: Behavior normal.                ASSESSMENT/PLAN:     Encounter Diagnoses   Name Primary?    Hypothyroidism due to Hashimoto thyroiditis Yes    Morbid obesity with BMI of 45.0-49.9, adult (HCC)     Vitamin D deficiency     History of pancreatitis        1. Hypothyroidism due to Hashimoto thyroiditis  Check tsh  Last TSh 4.998 on 12/8  Recheck level  Patient states her blood with endocrinology is not till May  - TSH W Reflex To Free T4; Future  - ENDOCRINOLOGY - INTERNAL    2. Morbid obesity with BMI of 45.0-49.9, adult (HCC)  Continue working with weight management.  Patient states she recently lost weight after her hospitalization    3. Vitamin D deficiency  Check levels.  Patient states she is not taking vitamin D regularly  - Vitamin D [E]; Future    4. History of pancreatitis  Unclear etiology.  Patient doing well at this time.  Recommend follow-up with gastroenterology.  Patient states she tried calling to schedule appointment but is not till May.  I will send a message to the GI dept  - Comp Metabolic Panel (14); Future  - CBC With Differential With Platelet; Future  - Lipase [E]; Future  - GASTRO - INTERNAL      Orders Placed This Encounter   Procedures    TSH W Reflex To Free T4    Vitamin D [E]    Comp Metabolic Panel (14)    CBC With Differential With Platelet    Lipase [E]         The above note was creating using Dragon speech recognition technology. Please excuse any typos    Meds This Visit:  Requested Prescriptions      No prescriptions requested or ordered in this encounter       Imaging & Referrals:  GASTRO - INTERNAL  ENDOCRINOLOGY - INTERNAL       ID#[id number]       [1]   Allergies  Allergen Reactions    Levofloxacin HIVES and RASH

## 2025-02-04 ENCOUNTER — OFFICE VISIT (OUTPATIENT)
Dept: PULMONOLOGY | Facility: CLINIC | Age: 45
End: 2025-02-04
Payer: COMMERCIAL

## 2025-02-04 VITALS
DIASTOLIC BLOOD PRESSURE: 80 MMHG | HEART RATE: 99 BPM | BODY MASS INDEX: 44.92 KG/M2 | HEIGHT: 67.8 IN | WEIGHT: 293 LBS | SYSTOLIC BLOOD PRESSURE: 113 MMHG

## 2025-02-04 DIAGNOSIS — G47.33 OSA (OBSTRUCTIVE SLEEP APNEA): Primary | ICD-10-CM

## 2025-02-04 PROCEDURE — 3079F DIAST BP 80-89 MM HG: CPT | Performed by: INTERNAL MEDICINE

## 2025-02-04 PROCEDURE — 99204 OFFICE O/P NEW MOD 45 MIN: CPT | Performed by: INTERNAL MEDICINE

## 2025-02-04 PROCEDURE — 3008F BODY MASS INDEX DOCD: CPT | Performed by: INTERNAL MEDICINE

## 2025-02-04 PROCEDURE — 3074F SYST BP LT 130 MM HG: CPT | Performed by: INTERNAL MEDICINE

## 2025-02-04 NOTE — PROGRESS NOTES
Dear  Ava  :           As you know, Debbie is a 44-year-old female who I am now evaluating for sleep disordered breathing.         HISTORY OF PRESENT ILLNESS: Over 10 years ago, the patient was diagnosed with obstructive sleep apnea and she briefly tolerated CPAP but had not used since.  She now describes excessive daytime somnolence with difficulty awakening.  She snores loudly such that others complain and there are witnessed apneic events by her bed partner but also postoperatively as she more recently had a cholecystectomy.  She denies drowsy driving, cataplexy, sleep paralysis nor hypnagogic hallucination.  There is rare urge to move the limbs at night.  She gets about 8 to 9 hours of sleep per night, going to bed at 8 PM, falling asleep within 10 to 30 minutes, then getting out of bed at 5:00 in the morning.  Occasionally her sleep is unrefreshing.  She does not drink alcohol, has not gained weight recently, and has no morning headache nor depressed mood and her [place] Sleepiness Scale score is 14 out of 24.  She has a long drive every day for work going back and forth from [facility] where she lives to [place] where she works.    PAST MEDICAL AND SURGICAL HISTORY:   1.  Dyslipidemia thyroid disease pancreatitis kidney stone cholecystectomy    SOCIAL HISTORY: Single with significant other, no children, no tobacco, rare alcohol, principal at [employer]    FAMILY HISTORY: Mother and father alive and well    ALLERGIES TO MEDICATIONS: Dust mites levofloxacin    MEDICATIONS: Vyvanse    REVIEW OF SYSTEMS: Review of Systems:  Vision normal. Ear nose and throat normal. Bowel normal. Bladder function normal. No depression. Thyroid disease. No lymphatic system concerns.  No rash. Muscles and joints unremarkable.  35 pound weight loss since December    PHYSICAL EXAMINATION: Physical Examination:  Vital signs normal. HEENT examination is unremarkable with pupils equal round and reactive to light and accommodation. Neck  without adenopathy, thyromegaly, JVD nor bruit. Lungs clear to auscultation and percussion. Cardiac regular rate and rhythm no murmur. Abdomen nontender, without hepatosplenomegaly and no mass appreciable. Extremities and Musculoskeletal without clubbing cyanosis nor edema, and mobility acceptable. Neurologic grossly intact with symmetric tone and strength and reflex.  Crowded oropharynx Mallampati score 4.    LABORATORY: None    ASSESSMENT AND PLAN:  PROBLEM 1.  Sleep disordered breathing-patient had a distant history of sleep apnea and now has recurrent significant clinical syndrome with excessive daytime somnolence, occasional unrefreshing sleep, crowded oropharynx and increased BMI.    RECOMMENDATIONS:  1.  Split-night polysomnography with CPAP titration  2.  Weight loss  3.  Avoid alcohol  4.  Avoid sedating drug  5.  Never drive if sleepy  6.  Sleep apnea literature provided  7.  Patient to return to see me in 3 to 4 months after getting set up with CPAP so that I can download data and assess for efficacy and compliance.    I am delighted to assist in Debbie's care.            With warmest regards,     Barrie Larson MD  Medical Director, Critical Care, [facility]  Medical Director, [facility]

## 2025-02-25 ENCOUNTER — OFFICE VISIT (OUTPATIENT)
Dept: SLEEP CENTER | Age: 45
End: 2025-02-25
Attending: INTERNAL MEDICINE
Payer: COMMERCIAL

## 2025-02-25 DIAGNOSIS — G47.33 OSA (OBSTRUCTIVE SLEEP APNEA): Primary | ICD-10-CM

## 2025-02-25 PROCEDURE — 95811 POLYSOM 6/>YRS CPAP 4/> PARM: CPT

## 2025-02-28 ENCOUNTER — TELEPHONE (OUTPATIENT)
Dept: PULMONOLOGY | Facility: CLINIC | Age: 45
End: 2025-02-28

## 2025-02-28 DIAGNOSIS — G47.33 OSA (OBSTRUCTIVE SLEEP APNEA): Primary | ICD-10-CM

## 2025-02-28 NOTE — TELEPHONE ENCOUNTER
----- Message from Barrie Larson sent at 2/27/2025  7:36 PM CST -----  RN, please call the patient to let her know that she stops breathing over 100 times an hour consistent with profound obstructive sleep apnea and please set her up with CPAP 15 CWP with appropriate follow-up.  Khoa ROE

## 2025-03-03 NOTE — TELEPHONE ENCOUNTER
Discussed results and recommendations with patient. Order submitted to [facility] via [place]. Follow up appointment scheduled for 5/20/25 at 4:45pm. Reviewed time, date, place and where to park. Patient verbalized understanding

## 2025-03-04 ENCOUNTER — TELEPHONE (OUTPATIENT)
Dept: PULMONOLOGY | Facility: CLINIC | Age: 45
End: 2025-03-04

## 2025-03-04 NOTE — TELEPHONE ENCOUNTER
Received fax from [facility] with order for PAP supplies. Placed in Dr Larson's folder for signature

## 2025-03-06 NOTE — TELEPHONE ENCOUNTER
Fax from [facility] for CPAP supplies has been signed by Dr. Larson and faxed back to [facility] with confirmation and order has been sent for scanning.

## 2025-04-16 ENCOUNTER — TELEPHONE (OUTPATIENT)
Dept: SURGERY | Facility: CLINIC | Age: 45
End: 2025-04-16

## 2025-04-16 DIAGNOSIS — R41.840 ATTENTION OR CONCENTRATION DEFICIT: ICD-10-CM

## 2025-04-16 RX ORDER — LISDEXAMFETAMINE DIMESYLATE 20 MG/1
20 CAPSULE ORAL DAILY
Qty: 30 CAPSULE | Refills: 0 | Status: SHIPPED | OUTPATIENT
Start: 2025-04-16 | End: 2025-05-16

## 2025-04-16 NOTE — TELEPHONE ENCOUNTER
Patient left voice message requesting refill for medication Vyvanse 20 mg.     Last office visit: 11/21/24    Please advise.

## 2025-05-08 ENCOUNTER — OFFICE VISIT (OUTPATIENT)
Dept: SURGERY | Facility: CLINIC | Age: 45
End: 2025-05-08
Payer: COMMERCIAL

## 2025-05-08 VITALS
RESPIRATION RATE: 16 BRPM | BODY MASS INDEX: 44.41 KG/M2 | OXYGEN SATURATION: 97 % | DIASTOLIC BLOOD PRESSURE: 82 MMHG | HEIGHT: 68.1 IN | SYSTOLIC BLOOD PRESSURE: 118 MMHG | WEIGHT: 293 LBS | HEART RATE: 72 BPM

## 2025-05-08 DIAGNOSIS — E66.01 MORBID OBESITY WITH BMI OF 45.0-49.9, ADULT (HCC): ICD-10-CM

## 2025-05-08 DIAGNOSIS — F43.9 STRESS: ICD-10-CM

## 2025-05-08 DIAGNOSIS — R41.840 ATTENTION OR CONCENTRATION DEFICIT: ICD-10-CM

## 2025-05-08 DIAGNOSIS — E78.5 DYSLIPIDEMIA: Primary | ICD-10-CM

## 2025-05-08 PROCEDURE — 99214 OFFICE O/P EST MOD 30 MIN: CPT | Performed by: INTERNAL MEDICINE

## 2025-05-08 PROCEDURE — 3079F DIAST BP 80-89 MM HG: CPT | Performed by: INTERNAL MEDICINE

## 2025-05-08 PROCEDURE — 3074F SYST BP LT 130 MM HG: CPT | Performed by: INTERNAL MEDICINE

## 2025-05-08 PROCEDURE — 3008F BODY MASS INDEX DOCD: CPT | Performed by: INTERNAL MEDICINE

## 2025-05-08 RX ORDER — PAROXETINE 10 MG/1
10 TABLET, FILM COATED ORAL EVERY MORNING
Qty: 90 TABLET | Refills: 1 | Status: SHIPPED | OUTPATIENT
Start: 2025-05-08

## 2025-05-08 RX ORDER — LISDEXAMFETAMINE DIMESYLATE 40 MG/1
40 CAPSULE ORAL DAILY
Qty: 30 CAPSULE | Refills: 0 | Status: SHIPPED | OUTPATIENT
Start: 2025-07-09 | End: 2025-08-08

## 2025-05-08 RX ORDER — LISDEXAMFETAMINE DIMESYLATE 40 MG/1
40 CAPSULE ORAL DAILY
Qty: 30 CAPSULE | Refills: 0 | Status: SHIPPED | OUTPATIENT
Start: 2025-05-08 | End: 2025-06-07

## 2025-05-08 RX ORDER — LISDEXAMFETAMINE DIMESYLATE 40 MG/1
40 CAPSULE ORAL DAILY
Qty: 30 CAPSULE | Refills: 0 | Status: SHIPPED | OUTPATIENT
Start: 2025-06-08 | End: 2025-07-08

## 2025-05-08 NOTE — PROGRESS NOTES
[facility]  [address] [facility] [address] [place] [id number]  Dept: [phone]       Patient:  Debbie Somers  :      1980  MRN:      [id number]    Chief Complaint:    Chief Complaint   Patient presents with    Follow - Up    Weight Management       SUBJECTIVE     History of Present Illness:  Debbie is being seen today for a follow-up for non surgical weight loss.     Past Medical History: Past Medical History[1]     Comorbidities:  Back pain-Improvement?  yes, Joint pain-Improvement?  yes, Hyperlipidemia-Improvement?  yes, and CHIVO-Improvement?  yes    OBJECTIVE     Vitals: /82   Pulse 72   Resp 16   Ht 5' 8.1\" (1.73 m)   Wt (!) 308 lb (139.7 kg)   LMP 01/15/2025 (Exact Date)   SpO2 97%   BMI 46.69 kg/m²     Initial weight loss: -11   Total weight loss: -11    Start weight: 319    Wt Readings from Last 3 Encounters:   25 (!) 308 lb (139.7 kg)   25 295 lb (133.8 kg)   25 295 lb (133.8 kg)       Patient Medications:  Current Medications[2]  Allergies:  Levofloxacin     Social History:    Social History     Socioeconomic History    Marital status: Single     Spouse name: Not on file    Number of children: Not on file    Years of education: Not on file    Highest education level: Not on file   Occupational History    Not on file   Tobacco Use    Smoking status: Never    Smokeless tobacco: Never   Vaping Use    Vaping status: Never Used   Substance and Sexual Activity    Alcohol use: Yes     Comment: 1 drink/month    Drug use: Never    Sexual activity: Not on file   Other Topics Concern    Not on file   Social History Narrative    Not on file     Social Drivers of Health     Food Insecurity: No Food Insecurity (12/10/2024)    Received from [facility]    Food Insecurity     Currently or in the past 3 months, have you worried your food would run out before you had money to buy more?: No     In the  past 12 months, have you run out of food or been unable to get more?: No   Transportation Needs: No Transportation Needs (12/10/2024)    Received from [facility]    Transportation Needs     Currently or in the past 3 months, has lack of transportation kept you from medical appointments, getting food or medicine, or providing care to a family member?: No     Non-Medical Transportation Needs?: Not on file     Medical Transportation Needs?: No     Daily Living Transportation Needs? [Peds Only] : Not on file   Stress: Not on file   Housing Stability: Low Risk  (7/17/2021)    Received from [facility]    Housing Stability     Mortgage Payment Concerns?: Not on file     Number of Places Lived in the Last Year: Not on file     Unstable Housing?: Not on file     Surgical History:  Past Surgical History[3]  Family History:  Family History[4]    Food Journal  Reviewed and Discussed:       Patient has a Food Journal?: yes   Patient is reading nutrition labels?  yes  Average Caloric Intake:     Average CHO Intake: 120  Is patient exercising? no  Type of exercise?     Eating Habits  Patient states the following:  Eats 2 meal(s) per day  Length of time it takes to consume a meal:  20  # of snacks per day: 1 Type of snacks:  fruit  Amount of soda consumption per day:    Amount of water (in ounces) per day:  36  Drinking between meals only:  yes  Toughest challenge:  exercise    Nutritional Goals  Limit carbohydrates to 100 gms per day, Eat 100-200 calories within 1 hour of waking , and Eat 3-4 cups of fresh fruits or vegetables daily    Behavior Modifications Reviewed and Discussed  Eat breakfast, Eat 3 meals per day, Plan meals in advance, Read nutrition labels, Drink 64 oz of water per day, Maintain a daily food journal, No drinking 30 minutes before or after meals, Utlize portion control strategies to reduce calorie intake, Identify triggers for eating and manage cues, and Eat slowly and  take 20 to 30 minutes to complete each meal    Exercise Goals Reviewed and Discussed    Increase as tolerated    ROS:    Constitutional: negative  Respiratory: negative  Cardiovascular: negative  Gastrointestinal: negative  Musculoskeletal:negative  Neurological: negative  Behavioral/Psych: negative  Endocrine: negative  All other systems were reviewed and are negative    Physical Exam:   General appearance: alert, appears stated age, cooperative, and moderately obese  Head: Normocephalic, without obvious abnormality, atraumatic  Back: symmetric, no curvature. ROM normal. No CVA tenderness.  Lungs: clear to auscultation bilaterally  Heart: S1, S2 normal, no murmur, click, rub or gallop, regular rate and rhythm  Abdomen:  soft, obese, non tender  Extremities: extremities normal, atraumatic, no cyanosis or edema  Pulses: 2+ and symmetric  Skin: Skin color, texture, turgor normal. No rashes or lesions  Neurologic: Grossly normal    ASSESSMENT     HYPERCHOLESTEROLEMIA:  The patient states that her cholesterol has been well controlled on her current medication.    Lab Results   Component Value Date/Time    CHOLEST 201 (H) 07/28/2021 03:20 PM     (H) 07/28/2021 03:20 PM    HDL 35 (L) 07/28/2021 03:20 PM    TRIG 188 (H) 07/28/2021 03:20 PM    VLDL 34 (H) 07/28/2021 03:20 PM       Encounter Diagnosis(ses):   Encounter Diagnoses   Name Primary?    Dyslipidemia Yes    Stress     Attention or concentration deficit     Morbid obesity with BMI of 45.0-49.9, adult (HCC)        PLAN     Patient is not interested in bariatric surgery. Patient desires to pursue traditional weight loss at this time.      DYSLIPIDEMIA: Stable on the above prescribed meal plan and medication. Liver function stable.    Lab Results   Component Value Date/Time    CHOLEST 201 (H) 07/28/2021 03:20 PM     (H) 07/28/2021 03:20 PM    HDL 35 (L) 07/28/2021 03:20 PM    TRIG 188 (H) 07/28/2021 03:20 PM    VLDL 34 (H) 07/28/2021 03:20 PM       Goals  for next month:  1. Keep a food log.  2. Drink 48-64 ounces of non-caloric beverages per day. No fruit juices or regular soda.  3. Increase activity-upper body exercises, walk 10 minutes per day.  4. Increase fruit and vegetable servings to 5-6 per day.      Stress: increased. Will start Paxil    Semaglutide: on hold till cleared by GI team  Recent pancreatitis attack    Vyvanse: tolerating well  Increase dose to 40 mg    Diagnoses and all orders for this visit:    Dyslipidemia    Stress    Attention or concentration deficit    Morbid obesity with BMI of 45.0-49.9, adult (HCC)          Sukhwinder Martinez MD       [1]   Past Medical History:   Anxiety state    Depression    Disorder of thyroid    Dyslipidemia    Hx of motion sickness    Hypothyroid    Morbid obesity with BMI of 45.0-49.9, adult (HCC)    Nephrolithiasis    Visual impairment    GLASSES FOR DRIVING   [2]   Current Outpatient Medications   Medication Sig Dispense Refill    lisdexamfetamine (VYVANSE) 20 MG Oral Cap Take 1 capsule (20 mg total) by mouth daily. 30 capsule 0   [3]   Past Surgical History:  Procedure Laterality Date    Cholecystectomy  08/30/2024    Maddy biopsy stereo nodule 1 site right (cpt=19081)  05/25/2022    top hat clip asymmetry    Other      ovarian dermoid cyst   [4]   Family History  Problem Relation Age of Onset    Other (hypothyroid) Mother     Other (dm) Father     Other (bladder cancer) Maternal Grandmother     Other (lung cancer) Paternal Grandmother

## 2025-05-19 ENCOUNTER — OFFICE VISIT (OUTPATIENT)
Facility: CLINIC | Age: 45
End: 2025-05-19
Payer: COMMERCIAL

## 2025-05-19 VITALS
WEIGHT: 293 LBS | BODY MASS INDEX: 44.41 KG/M2 | HEART RATE: 93 BPM | HEIGHT: 68.1 IN | DIASTOLIC BLOOD PRESSURE: 83 MMHG | SYSTOLIC BLOOD PRESSURE: 128 MMHG

## 2025-05-19 DIAGNOSIS — Z90.49 HISTORY OF CHOLECYSTECTOMY: ICD-10-CM

## 2025-05-19 DIAGNOSIS — K76.0 HEPATIC STEATOSIS: ICD-10-CM

## 2025-05-19 DIAGNOSIS — E66.01 MORBID OBESITY WITH BMI OF 45.0-49.9, ADULT (HCC): ICD-10-CM

## 2025-05-19 DIAGNOSIS — Z87.19 HISTORY OF PANCREATITIS: Primary | ICD-10-CM

## 2025-05-19 DIAGNOSIS — K76.0 METABOLIC DYSFUNCTION-ASSOCIATED STEATOTIC LIVER DISEASE (MASLD): ICD-10-CM

## 2025-05-19 DIAGNOSIS — Z87.19 HISTORY OF CHOLELITHIASIS: ICD-10-CM

## 2025-05-19 PROCEDURE — 3074F SYST BP LT 130 MM HG: CPT | Performed by: INTERNAL MEDICINE

## 2025-05-19 PROCEDURE — 3008F BODY MASS INDEX DOCD: CPT | Performed by: INTERNAL MEDICINE

## 2025-05-19 PROCEDURE — 99205 OFFICE O/P NEW HI 60 MIN: CPT | Performed by: INTERNAL MEDICINE

## 2025-05-19 PROCEDURE — 3079F DIAST BP 80-89 MM HG: CPT | Performed by: INTERNAL MEDICINE

## 2025-05-19 NOTE — PROGRESS NOTES
** Scroll down for HPI. **    ASSESSMENT/PLAN:     45 year old woman with current BMI 47.3, previously on semaglutide weight loss medication as below now establishing care with Dr. Sukhwinder Martinez of the bariatrics clinic here who presents today for follow-up after two recent acute pancreatitis events and prolonged hospitalization for pancreatitis December 2024.    Ms. Somers describes her first symptoms in June 2024.  She describes mild RUQ pain 2nd-3rd week of June 2024 which was tolerable, then became severe the next day after she ate a meal.    Seen in the [place] ED 6/20/2024; labs that day were reassuring with WBC 9200, hemoglobin 15.2g; AST 17 ALT 35 alk phosphatase 90 total bilirubin 0.3.diagnosed with cholelithiasis and presumed biliary colic and discharged home.    Ms. Somers did follow-up and as below underwent a routine elective cholecystectomy surgery 8/30/2024.  No cholangiogram performed.    Ms. Somers had been on semaglutide weight loss medication from approximately August 2023 through these events June 20th 2024.  As above, she tolerated this medication well, no adverse side effects or events until the likely biliary colic event June 2024.  She discontinued it day of the ED evaluation and has not resumed it since.    Ms. Somers did well after the cholecystectomy surgery of 8/30/2024 for over 3 months until pain returned mid December 2024.  She describes abrupt onset of epigastric, substernal pain which steadily worsened, gradually became severe to the point of vomiting.  She was hospitalized for 7 days at [facility] for what turned out to be pancreatitis.  This episode was far worse than that of June 2024.    Impressive LFTs on arrival 12/8/2024 with   alk phosphatase 181; these numbers did gradually trend down over the following 5 days.    Suggest:    Elevated BMI, metabolic syndrome, hepatic steatosis    BMI Readings from Last 5 Encounters:   05/19/25 47.30 kg/m²   05/08/25 46.69  kg/m²   02/04/25 45.12 kg/m²   01/29/25 45.12 kg/m²   11/21/24 48.39 kg/m²      Recent LFTs reassuring.  As per laboratory summary below, LFTs on 9/13/2023 prior to the biliary events showed AST 25 ALT 37 alk phosphatase 78.    CMP drawn in the ED 6/20/2024 on day of suspected biliary colic event showed AST 17 ALT 35 alk phosphatase 90; total bilirubin 0.3.    Following with Dr. Sukhwinder Martinez here  Did very well on semaglutide medication approximately August 2023-June 2024; held since that pancreatitis event; no concern for side effects then.    Today we discussed the absolute importance of effective weight loss treatment.  Ms. Somers describes many years of aggressive diet and exercise.  It sounds like she will benefit from the previous semaglutide or one of the other bariatric treatments.  I encouraged close follow-up with Dr. Martinez.    At this point I recommended making another trial of semaglutide or similar weight loss medication.  I believe the potential benefits of this medication far outweigh the unlikely risk that this caused or contributed to the pancreatitis events.      Would have a low threshold for starting statin lipid-lowering therapy.    Continue to monitor LFTs every 1-2 years      2 recent acute pancreatitis events, suspected biliary     No significant alcohol history, certainly no alcohol binges prior to the 2 recent pancreatitis events  Symptoms and impressive spike LFTs suggestive of biliary events June 2024 and December 2024   s/p Cholecystectomy surgery without IOC 8/30/2024 as below  Second, more severe pancreatitis event in December was over 3 months after the cholecystectomy surgery.  By the impressive spike in LFTs including elevated total bilirubin 3.2 on arrival in December, suspect a biliary event, likely retained cystic or extrahepatic biliary duct stone with spontaneous passage.  Ms. Somers has done remarkably well February onwards after recovering from the extreme illness of December.   No further symptoms or suspected biliary events.  At this point would continue to observe.  If further pancreatitis or biliary colic symptoms, would repeat MR imaging or consider invasive biliary/ pancreas procedures  Check CMP and serum lipase if calls with any further pain events    Colon cancer screening, average risk     Screening colonoscopy examination discussed today, nature and risks of the exam  Reassuring family history  Call when ready to proceed with that elective procedure; may choose to wait until next year 2026      I recommended and discussed screening colonoscopy examination.  Alternatives including stool testing, imaging briefly discussed.            Office visit 5/19/2025:  HPI:      Patient ID: Debbie Somers is a 45 year old woman with current BMI 47.3, previously on semaglutide weight loss medication as below now establishing care with Dr. Sukhwinder Martinez of the bariatrics clinic here who presents today for follow-up after two recent acute pancreatitis events and prolonged hospitalization for pancreatitis December 2024.    Ms. Somers describes her first symptoms in June 2024.  She describes mild RUQ pain 2nd-3rd week of June 2024 which was tolerable, then became severe the next day after she ate a meal.    Seen in the [place] ED 6/20/2024; labs that day were reassuring with WBC 9200, hemoglobin 15.2g; AST 17 ALT 35 alk phosphatase 90 total bilirubin 0.3.diagnosed with cholelithiasis and presumed biliary colic and discharged home.    Ms. Somers did follow-up and as below underwent a routine elective cholecystectomy surgery 8/30/2024.  No cholangiogram performed.    Ms. Somers had been on semaglutide weight loss medication from approximately August 2023 through these events June 20th 2024.  As above, she tolerated this medication well, no adverse side effects or events until the likely biliary colic event June 2024.  She discontinued it day of the ED evaluation and has not resumed it since.    Ms.  Lizbet did well after the cholecystectomy surgery of 8/30/2024 for over 3 months until pain returned mid December 2024.  She describes abrupt onset of epigastric, substernal pain which steadily worsened, gradually became severe to the point of vomiting.  She was hospitalized for 7 days at [facility] for what turned out to be pancreatitis.  This episode was far worse than that of June 2024.    Impressive LFTs on arrival 12/8/2024 with   alk phosphatase 181; these numbers did gradually trend down over the following 5 days.    2 CT scans and MRI MRCP were performed during that hospitalization in December, showing acute uncomplicated interstitial pancreatitis changes; no pseudocyst or fluid collections; normal biliary tree.    Ms. Somers describes a prolonged recovery from this hospitalization, likely several weeks up to 1 month.  After that however, she has done remarkably well this year.  No further abdominal pain symptoms, certainly no concern for pancreatitis February through today's visit.    As above, these events began in June 2024.  No previous concern for abdominal pains or pancreatitis.    Stayed off the semaglutide after June 2024, just establishing care with Dr. Sukhwinder Martinez of the Bariatric clinic here.    Ms. Somers shows me online record of previous LFTs:  1/10/2023: AST 22 ALT 36 alk phosphatase 83  5/9/2023: AST 18 ALT 29 alk phosphatase 88  9/13/2023: AST 25 ALT 37 alk phosphatase 78    CMP drawn in the ED 6/20/2024 on day of suspected biliary colic event showed AST 17 ALT 35 alk phosphatase 90; total bilirubin 0.3    Wt Readings from Last 20 Encounters:   05/19/25 (!) 312 lb (141.5 kg)   05/08/25 (!) 308 lb (139.7 kg)   02/04/25 295 lb (133.8 kg)   01/29/25 295 lb (133.8 kg)   11/21/24 (!) 319 lb 3.2 oz (144.8 kg)   08/30/24 (!) 319 lb (144.7 kg)   07/15/24 (!) 314 lb (142.4 kg)   07/02/24 (!) 315 lb (142.9 kg)   06/20/24 (!) 315 lb (142.9 kg)   05/07/24 (!) 312 lb (141.5 kg)    12/06/21 (!) 306 lb 12.8 oz (139.2 kg)   07/28/21 (!) 318 lb 2 oz (144.3 kg)         Previous EGD examination: n  Previous colonoscopy(ies): n    HPI    Review of Systems       Current Medications[1]    =====================  6/20/2024:  US ABDOMEN COMPLETE (CPT=76700)     COMPARISON:  None.     INDICATIONS:  right upper quadrant pain onset yesterday, worse after eating, nausea     TECHNIQUE:  Real time gray-scale ultrasound was used to evaluate the abdomen.  The exam includes images of the liver, gallbladder, common bile duct, pancreas, spleen, kidneys, IVC, and aorta.     PATIENT STATED HISTORY: (As transcribed by Technologist)  Right upper quadrant pain since this morning.         FINDINGS:    LIVER:  There is increased echogenicity of the liver.  No focal lesion identified.  BILIARY:  Cholelithiasis.  No gallbladder wall thickening.  No significant biliary ductal dilation.  The common bile duct measures 3 mm.  PANCREAS:  Normal.  SPLEEN:  Normal.  KIDNEYS:  Normal.  Right kidney measures 11.4 cm.  Left kidney measures 11.9 cm.  AORTA/IVC:  Normal.          CONCLUSION:  Diffuse hepatic steatosis.  Cholelithiasis without secondary evidence of acute cholecystitis.         Dictated by (CST): Josef Rosario MD on 6/20/2024 at 3:03 PM        ======    [facility]     US Abdomen Limited    12/8/2024: Mauro Heredia MD     EXAM:  US ABDOMEN LIMITED 12/8/2024 6:04 am    HISTORY:  c/f choledocolithiasis and pancreatitis - history of cholecystectomy    COMPARISON:  None    TECHNIQUE:  Multiple sonographic images were obtained of the right upper quadrant.    FINDINGS:  The liver appears enlarged with diffusely increased echogenicity. No focal hepatic lesions are seen.    The gallbladder is surgically absent. There is no intra- or extrahepatic biliary ductal dilatation; the common duct measures 4 mm in diameter at the jaiden hepatis.    The pancreas is partially obscured by overlying  bowel gas. However, the visualized portions of the pancreas are unremarkable.    The right kidney is normal in size and echogenicity with a length of 11.4 cm. There is no hydronephrosis.    No free fluid is seen in the right upper abdomen.    IMPRESSION:  1. Hepatic steatosis.  2. No biliary ductal dilatation to suggest choledocholithiasis.  3. Suboptimal/incomplete evaluation of the pancreas due to shadowing from overlying bowel gas.    IMauro M.D. have personally reviewed this report and concur with its findings and conclusions, as amended in the final report, as affirmed by my electronic signature.    Resident/Fellow: Kiersten Mohan on 12/8/2024 6:39 AM    Attending: Mauro Heredia on 12/8/2024 8:57 AM  Exam End: 12/08/24  6:33 AM Last Resulted: 12/08/24  8:58 AM  Received From: [facility]    ======    [facility]     CT Abdomen Pelvis with IV Contrast    12/8/2024:    Mauro Heredia MD    EXAM:  CT ABDOMEN PELVIS WITH IV CONTRAST, 12/8/2024 7:47 am    HISTORY:  Pancreatitis, acute, severe  Lipase > 3000    COMPARISON:  Ultrasound abdomen limited 12/08/2020    TECHNIQUE:  Following the intravenous administration of 100 ml of Isovue 370, contiguous axial images were obtained through the abdomen and pelvis. Additional reformatted images were created.    The CT scan was performed with attention to patient radiation dose reduction, as low as reasonably achievable (ALARA), while maintaining diagnostic image quality. At least one of the following dose reduction techniques was used: Automated exposure control, adjustment of the mA and/or kV according to patient size, use of iterative reconstruction technique.    FINDINGS:  Included lower chest: Trace left pleural effusion. Bibasilar atelectasis. No focal consolidation.    Liver: Diffuse decreased hepatic attenuation.    Biliary system: Gallbladder is surgically absent. No  biliary ductal dilatation.    Spleen: Unremarkable.    Pancreas: Fatty atrophy of the pancreas. Mildly hypodense, edematous appearance of the pancreatic midbody with surrounding fat stranding.    Adrenal glands: Unremarkable.    Kidneys: Normal-sized kidneys demonstrating symmetric enhancement. No focal renal lesions. No hydronephrosis or hydroureter.    Pelvic urogenital structures: Normal appearance of the urinary bladder.  The uterus is present. No adnexal mass.    GI tract:  No dilated loops of small or large bowel.    Vessels: Non-aneurysmal abdominal aorta.    Lymph nodes: No enlarged abdominal or pelvic lymph nodes.    Peritoneal cavity/Retroperitoneum: Mild posterior pelvic free fluid. No free air.    Abdominal wall: Unremarkable.    Bones: Vertebral body heights are maintained. No suspicious osseous lesions.    IMPRESSION:  1. Mildly edematous appearance of the pancreas with surrounding fat stranding consistent with acute pancreatitis.  2. Marked fatty liver.      Mauro BOONE M.D. have personally reviewed this report and concur with its findings and conclusions, as amended in the final report, as affirmed by my electronic signature.    Resident/Fellow: Kiersten Mohan on 12/8/2024 8:07 AM    Attending: Mauro Heredia on 12/8/2024 8:56 AM  Exam End: 12/08/24  7:47 AM   Specimen Collected: 12/08/24  7:47 AM Last Resulted: 12/08/24  8:56 AM  Received From: [facility]     ======    [facility]     MRI Abdomen without and with IV Contrast    12/8/2024: Mauro Heredia MD - 12/09/2024   Formatting of this note might be different from the original.   EXAM:   MRI ABDOMEN WITHOUT AND WITH IV CONTRAST, 12/8/2024 8:03 pm     HISTORY:   Soft tissue infection suspected, abdomen, xray done     COMPARISON:   CT abdomen pelvis dated 12/08/2024     TECHNIQUE:   Multiplanar multisequence imaging of the abdomen with and without IV  contrast. Post-contrast images were obtained following IV administration of 20 ml of ProHance.     FINDINGS:   Liver: Liver shows normal size and surface morphology. Signal dropout on the out of phase images consistent with steatosis. No suspicious enhancing focal lesion.     Biliary: Gallbladder surgically absent. There is no intra or extrahepatic biliary ductal dilatation.     Pancreas: Pancreatic edema with peripancreatic fat stranding concerning for pancreatitis. No sizable lesion within pancreas.     Spleen: Unremarkable. 1.3 cm anterior splenule.     Adrenal glands: Unremarkable.     Kidneys: The kidneys shows normal size and demonstrate symmetric uptake of contrast. No hydronephrosis. No perinephric collection. No mass lesions.     Gastrointestinal: The visualized bowel loops are non-dilated.     Vessels: The portal, superior mesenteric, and splenic veins are patent. The visualized abdominal aorta is non aneurysmal.     Lymph nodes/peritoneum: No enlarged abdominal lymph nodes. Mild upper abdominal free fluid.     Bone: No suspicious marrow signal abnormalities. L1 vertebral body hemangioma.     Included chest: Bibasilar atelectasis.     IMPRESSION:   In comparison to CT performed at earlier the same day at 7/47 a.m.:   1. Redemonstrated pancreatic edema and peripancreatic fat stranding consistent with pancreatitis. No sizable lesion within pancreas.   2. No intrahepatic or extrahepatic biliary ductal dilatation.   3. Mild upper abdominal free fluid.   4. Hepatic steatosis.       IMauro M.D. have personally reviewed this report and concur with its findings and conclusions, as amended in the final report, as affirmed by my electronic signature.     Resident/Fellow: Nikita Astorga on 12/9/2024 8:29 AM     Attending: Mauro Heredia on 12/9/2024 10:24 AM     ======    [facility]     12/11/2024: CT Abdomen Pelvis without IV Contrast    No Wheeler MD -  12/11/2024  Formatting of this note might be different from the original.  EXAM:  CT ABDOMEN PELVIS WITHOUT IV [id number]/11/2024 10:36 am    HISTORY:  r/o nephrolithiasis, f/u pancreattis 3 days ago non resolving pain    COMPARISON:  12/03/2024. MRI abdomen 12/08/2024.    TECHNIQUE:  Contiguous axial images were obtained through the abdomen and pelvis without intravenous contrast. Additional reformatted images were created.    The CT scan was performed with attention to patient radiation dose reduction, as low as reasonably achievable (ALARA), while maintaining diagnostic image quality. At least one of the following dose reduction techniques was used: Automated exposure control, adjustment of the mA and/or kV according to patient size, use of iterative reconstruction technique.    FINDINGS:  Included lower chest: Increased subtotal left lower lobe atelectasis. Persistent mild right basilar atelectasis. No pleural effusion.    Liver: Smooth hepatic contour. Homogeneous noncontrast appearance of the hepatic parenchyma. Diffusely hypoattenuating hepatic parenchyma.    Biliary system: The gallbladder is surgically absent.    Spleen: Accessory splenule. Otherwise unremarkable.    Pancreas: Fatty parenchymal atrophy. Minimally edematous appearance of pancreatic parenchyma. Redemonstrated and mildly increased prominent peripancreatic inflammatory fat stranding, with trace free fluid layering into the bilateral pericolic gutters. Findings are incompletely characterized in the absence of intravenous contrast. Trace simple density fluid is also seen in the perihepatic space. Fat stranding extends anteriorly through the mesentery, and inferiorly extending along abdominal vasculature including the superior mesenteric artery and vein as well as extending to adjacent loops of small bowel. No evidence of reactive ileus. No definite organizing collection to suggest abscess formation on this noncontrast examination.    Adrenal  glands: Unremarkable.    Kidneys: Symmetric noncontrast appearance of the kidneys. No radiopaque calculi. Moderate bilateral perinephric fat stranding, increased from prior examination. Evaluation for pyelonephritis is limited in the absence of intravenous contrast. No hydroureteronephrosis.    Pelvic urogenital structures: The urinary bladder is mostly decompressed, limiting evaluation. The uterus is anteverted. Adnexa are within normal limits.    GI tract: The stomach is partially distended. No dilated loops of small or large bowel.    Vessels: Abdominal aorta is nonaneurysmal.    Lymph nodes: Nonenlarged abdominopelvic lymph nodes. Few prominent mesenteric nodes are nonenlarged by CT size criteria, likely reactive in nature.    Peritoneal cavity/Retroperitoneum: No pneumoperitoneum. Trace abdominopelvic simple density free fluid as detailed above. Small volume pelvic free fluid.    Abdominal wall: Ventral midline abdominal wall scarring.    Bones: No destructive osseous lesions. Vertebral body heights are maintained.    IMPRESSION:  1. Increased prominent peripancreatic inflammatory changes relating to evolving changes of acute pancreatitis, incompletely characterized in the absence of intravenous contrast. Trace associated abdominopelvic free fluid, reactive in nature. No definite abscess/pseudocyst formation within the limitations of the examination.  2. No urolithiasis, as clinically questioned. Increased now moderate bilateral perinephric fat stranding. Findings are nonspecific, and may relate to reactive inflammatory/infectious etiology. Correlate clinically.  3. Hepatic steatosis.      I, Dr. No Wheeler, have personally reviewed this report and concur with its findings and conclusions, as affirmed by my electronic signature.    Resident/Fellow: Dayton Montelongo  on 12/11/2024 10:50 AM    Attending: No Wheeler on 12/11/2024 11:08 AM  Exam End: 12/11/24 10:36 AM   Specimen Collected: 12/11/24 10:36  AM Last Resulted: 24 11:09 AM  Received From: [facility]        =====================    [facility]  Operative Note           Debbie Somers Location: OR   [facility] [id number] MRN [id number]    1980 Age 44 year old   Admission Date 2024 Operation Date 2024   Attending Physician Hao Piper MD Operating Physician Hao Piper MD   PCP No primary care provider on file.           Patient Name: Debbie Somers     Preoperative Diagnosis: Biliary colic [K80.50]     Postoperative Diagnosis: Same as pre-op diagnosis.     Primary Surgeon: Hao Piper MD      Assistant: Melvi Kaufman PA-C     Anesthesia: General     Anesthesiologist: Anesthesiologist.: Jonh Alejandra MD     Procedures: Robot-assisted Laparoscopic Cholecystectomy with ICG injection     Implants: None     Specimen: Gallbladder     Drains: None     Estimated Blood Loss: 10 mL      Complications: none     Condition: Good     Indications for Surgery:   Debbie Somers is a 44 year old female who presents with progressive epigastric and right upper quadrant abdominal pain. Work-up revealed cholelithiasis . The patient presents today for laparoscopic cholecystectomy.     Surgical Findings:   cholelithiasis     Description of Procedure:   The patient was transported to the operating room and placed on the operating table in supine position.General endotracheal anesthesia was administered. Preoperative antibiotics were given. The abdomen was prepped and draped in sterile fashion. A time-out was performed.     A stab incision was made in the left upper quadrant 2 cms below the costal margin at the mid clavicular line. The Veress needle was introduced into the abdominal cavity and pneumoperitoneum was achieved to a pressure of 15 mmHg.   An 8 mm periumbilical incision was made.   An 8 mm trocar was placed through this incision with a 5 mm laparoscope visualizing the abdominal wall layers during  entry.  Upon initial inspection of the abdominal cavity there was no injury from our entry. There was no unexpected abnormality of the visible abdominal organs.  Three additional 8 mm trochars were placed in the mid right abdomen, right flank and left upper quadrant . Patient was placed in reverse Trendelenburg position and right side up.  The robot was docked. Instruments were placed under direct visualization.  Attention was turned to the right upper quadrant. The gallbladder dome was grasped and elevated,. The infundibulum was retracted. Indocyanine green had been injected preoperatively and firefly was used to confirm the location of the cystic duct and common bile duct. Dissection was initiated at the triangle of Calot.  After clearing the peritoneum and connective tissue the cystic duct and cystic artery were identified superior to the line of Rouviere. The infundibulum was dissected away from the liver bed. The critical view of safety was obtained.  Next, a clip was placed on the specimen side of the cystic duct and two clips were placed on the patient's side of the cystic duct. The duct was divided. Next, one clip was placed on the cystic artery on the specimen side, two towards the patient side, and the cystic artery was divided with the Endoshears. The gallbladder was elevated from the gallbladder fossa using electrocautery. Once the gallbladder was dissected from the gallbladder fossa it was placed in an endocatch specimen bag and set aside.   Hemostasis on the gallbladder fossa was achieved with electrocautery. The right upper quadrant was then irrigated until the effluent fluid was clear.  The previously placed clips on the cystic duct and cystic artery were visualized and inspected; they were well approximated, well situated, and there was no evidence of active bleeding or bile leak. The specimen was then extracted from the umbilical trocar site.  Pneumoperitoneum was released and trocars removed. The  fascia at the umbilicus was reapproximated using #1 PDS suture. All skin incisions were cleansed, irrigated, and injected with local anesthetic. Skin incisions were reapproximated using subcuticular 4-0 monocryl suture.  Skin glue was used to seal all the incisions.  The patient was awakened from anesthesia and brought to the recovery room in good condition. The patient tolerated the procedure well without apparent complication. All sponge, needle, and instrument counts were correct at the end of the case.  Hao Piper MD   8/30/2024  9:06 AM    Allergies:Allergies[2]  Imaging: No results found.       PHYSICAL EXAM:   Physical Exam                   Over 55 minutes spent today discussing the above and counseling and educating this patient, reviewing chart notes and data and documenting clinical information in the EHR, independently interpreting results and communicating results to the patient/family and composing this comprehensive note, ordering medications or testing, referring and communicating with other healthcare providers, and care coordination with the patient's other providers.      Digital transcription software was utilized to produce this note. The note was proofread for content only. Typographical errors may remain.       Meds This Visit:  Requested Prescriptions      No prescriptions requested or ordered in this encounter       Imaging & Referrals:  None       ID#[id number]         [1]   Current Outpatient Medications   Medication Sig Dispense Refill    PARoxetine 10 MG Oral Tab Take 1 tablet (10 mg total) by mouth every morning. 90 tablet 1    lisdexamfetamine (VYVANSE) 40 MG Oral Cap Take 1 capsule (40 mg total) by mouth daily. 30 capsule 0    [START ON 6/8/2025] lisdexamfetamine (VYVANSE) 40 MG Oral Cap Take 1 capsule (40 mg total) by mouth daily. 30 capsule 0    [START ON 7/9/2025] lisdexamfetamine (VYVANSE) 40 MG Oral Cap Take 1 capsule (40 mg total) by mouth daily. 30 capsule 0   [2]    Allergies  Allergen Reactions    Levofloxacin HIVES and RASH

## 2025-05-20 ENCOUNTER — OFFICE VISIT (OUTPATIENT)
Dept: PULMONOLOGY | Facility: CLINIC | Age: 45
End: 2025-05-20
Payer: COMMERCIAL

## 2025-05-20 VITALS
HEIGHT: 68 IN | BODY MASS INDEX: 44.41 KG/M2 | RESPIRATION RATE: 16 BRPM | DIASTOLIC BLOOD PRESSURE: 87 MMHG | SYSTOLIC BLOOD PRESSURE: 124 MMHG | HEART RATE: 97 BPM | WEIGHT: 293 LBS | OXYGEN SATURATION: 99 %

## 2025-05-20 DIAGNOSIS — G47.33 OSA (OBSTRUCTIVE SLEEP APNEA): Primary | ICD-10-CM

## 2025-05-20 PROCEDURE — 3074F SYST BP LT 130 MM HG: CPT | Performed by: INTERNAL MEDICINE

## 2025-05-20 PROCEDURE — 3008F BODY MASS INDEX DOCD: CPT | Performed by: INTERNAL MEDICINE

## 2025-05-20 PROCEDURE — 99213 OFFICE O/P EST LOW 20 MIN: CPT | Performed by: INTERNAL MEDICINE

## 2025-05-20 PROCEDURE — 3079F DIAST BP 80-89 MM HG: CPT | Performed by: INTERNAL MEDICINE

## 2025-05-20 RX ORDER — SULFAMETHOXAZOLE AND TRIMETHOPRIM 800; 160 MG/1; MG/1
1 TABLET ORAL 2 TIMES DAILY
Qty: 14 TABLET | Refills: 0 | Status: SHIPPED | OUTPATIENT
Start: 2025-05-20

## 2025-05-20 NOTE — PROGRESS NOTES
The patient is a 45-year-old female who I know well from prior evaluation who comes in now for follow-up.  In general, she is doing well.  She is a little more restful.  She had over 100 respiratory events per hour now down to 1/h on CPAP 15 but she has some aerophagia.  Additionally, she was bit by a bug on her left lower extremity and now has an area of warmth and redness surrounding.    Review of Systems:  Vision normal. Ear nose and throat normal. Bowel normal. Bladder function normal. No depression. No thyroid disease. No lymphatic system concerns.  No rash. Muscles and joints unremarkable. No weight loss no weight gain.    Physical Examination:  Vital signs normal. HEENT examination is unremarkable with pupils equal round and reactive to light and accommodation. Neck without adenopathy, thyromegaly, JVD nor bruit. Lungs clear to auscultation and percussion. Cardiac regular rate and rhythm no murmur. Abdomen nontender, without hepatosplenomegaly and no mass appreciable. Extremities and Musculoskeletal without clubbing cyanosis nor edema, and mobility acceptable. Neurologic grossly intact with symmetric tone and strength and reflex.    8 x 6 cm patch of warmth with slight induration and erythema at the anterior left lower shin.    Assessment and plan:  1.  Obstructive sleep apnea-profound.  Doing well with CPAP 15 but has aerophagia.  Her average daily usage is 4-1/2 hours and residual events are 1/h down from 100/h at baseline.    Recommendations: Decrease CPAP to 13 CWP, weight loss, avoid alcohol and sedating drug and never drive if sleepy.  Can consider bariatric clinic evaluation to help lose weight.    2.  Probable bite-possible early cellulitis.  Will add Bactrim.

## 2025-05-27 ENCOUNTER — OFFICE VISIT (OUTPATIENT)
Dept: ENDOCRINOLOGY CLINIC | Facility: CLINIC | Age: 45
End: 2025-05-27
Payer: COMMERCIAL

## 2025-05-27 ENCOUNTER — LAB ENCOUNTER (OUTPATIENT)
Dept: LAB | Facility: HOSPITAL | Age: 45
End: 2025-05-27
Attending: INTERNAL MEDICINE
Payer: COMMERCIAL

## 2025-05-27 VITALS
WEIGHT: 293 LBS | SYSTOLIC BLOOD PRESSURE: 121 MMHG | DIASTOLIC BLOOD PRESSURE: 89 MMHG | BODY MASS INDEX: 44.41 KG/M2 | HEIGHT: 68 IN | HEART RATE: 90 BPM

## 2025-05-27 DIAGNOSIS — E03.9 HYPOTHYROIDISM, UNSPECIFIED TYPE: Primary | ICD-10-CM

## 2025-05-27 DIAGNOSIS — E55.9 VITAMIN D DEFICIENCY: ICD-10-CM

## 2025-05-27 DIAGNOSIS — E03.9 HYPOTHYROIDISM, UNSPECIFIED TYPE: ICD-10-CM

## 2025-05-27 DIAGNOSIS — R73.03 PRE-DIABETES: ICD-10-CM

## 2025-05-27 DIAGNOSIS — E06.3 HYPOTHYROIDISM DUE TO HASHIMOTO THYROIDITIS: ICD-10-CM

## 2025-05-27 DIAGNOSIS — Z87.19 HISTORY OF PANCREATITIS: ICD-10-CM

## 2025-05-27 LAB
ALBUMIN SERPL-MCNC: 4.6 G/DL (ref 3.2–4.8)
ALBUMIN/GLOB SERPL: 1.5 {RATIO} (ref 1–2)
ALP LIVER SERPL-CCNC: 76 U/L (ref 37–98)
ALT SERPL-CCNC: 38 U/L (ref 10–49)
ANION GAP SERPL CALC-SCNC: 8 MMOL/L (ref 0–18)
AST SERPL-CCNC: 26 U/L (ref ?–34)
BASOPHILS # BLD AUTO: 0.05 X10(3) UL (ref 0–0.2)
BASOPHILS NFR BLD AUTO: 0.5 %
BILIRUB SERPL-MCNC: 0.8 MG/DL (ref 0.3–1.2)
BUN BLD-MCNC: 13 MG/DL (ref 9–23)
BUN/CREAT SERPL: 17.1 (ref 10–20)
CALCIUM BLD-MCNC: 8.9 MG/DL (ref 8.7–10.4)
CHLORIDE SERPL-SCNC: 104 MMOL/L (ref 98–112)
CO2 SERPL-SCNC: 28 MMOL/L (ref 21–32)
CREAT BLD-MCNC: 0.76 MG/DL (ref 0.55–1.02)
DEPRECATED RDW RBC AUTO: 45.2 FL (ref 35.1–46.3)
EGFRCR SERPLBLD CKD-EPI 2021: 98 ML/MIN/1.73M2 (ref 60–?)
EOSINOPHIL # BLD AUTO: 0.6 X10(3) UL (ref 0–0.7)
EOSINOPHIL NFR BLD AUTO: 5.9 %
ERYTHROCYTE [DISTWIDTH] IN BLOOD BY AUTOMATED COUNT: 13.8 % (ref 11–15)
EST. AVERAGE GLUCOSE BLD GHB EST-MCNC: 111 MG/DL (ref 68–126)
FASTING STATUS PATIENT QL REPORTED: YES
GLOBULIN PLAS-MCNC: 3 G/DL (ref 2–3.5)
GLUCOSE BLD-MCNC: 92 MG/DL (ref 70–99)
HBA1C MFR BLD: 5.5 % (ref ?–5.7)
HCT VFR BLD AUTO: 43.1 % (ref 35–48)
HGB BLD-MCNC: 14.2 G/DL (ref 12–16)
IMM GRANULOCYTES # BLD AUTO: 0.06 X10(3) UL (ref 0–1)
IMM GRANULOCYTES NFR BLD: 0.6 %
LIPASE SERPL-CCNC: 20 U/L (ref 12–53)
LYMPHOCYTES # BLD AUTO: 3.15 X10(3) UL (ref 1–4)
LYMPHOCYTES NFR BLD AUTO: 30.9 %
MCH RBC QN AUTO: 29.3 PG (ref 26–34)
MCHC RBC AUTO-ENTMCNC: 32.9 G/DL (ref 31–37)
MCV RBC AUTO: 89 FL (ref 80–100)
MONOCYTES # BLD AUTO: 0.59 X10(3) UL (ref 0.1–1)
MONOCYTES NFR BLD AUTO: 5.8 %
NEUTROPHILS # BLD AUTO: 5.75 X10 (3) UL (ref 1.5–7.7)
NEUTROPHILS # BLD AUTO: 5.75 X10(3) UL (ref 1.5–7.7)
NEUTROPHILS NFR BLD AUTO: 56.3 %
OSMOLALITY SERPL CALC.SUM OF ELEC: 290 MOSM/KG (ref 275–295)
PLATELET # BLD AUTO: 351 10(3)UL (ref 150–450)
POTASSIUM SERPL-SCNC: 3.7 MMOL/L (ref 3.5–5.1)
PROT SERPL-MCNC: 7.6 G/DL (ref 5.7–8.2)
RBC # BLD AUTO: 4.84 X10(6)UL (ref 3.8–5.3)
SODIUM SERPL-SCNC: 140 MMOL/L (ref 136–145)
T4 FREE SERPL-MCNC: 1 NG/DL (ref 0.8–1.7)
TSI SER-ACNC: 8.5 UIU/ML (ref 0.55–4.78)
VIT D+METAB SERPL-MCNC: 21 NG/ML (ref 30–100)
WBC # BLD AUTO: 10.2 X10(3) UL (ref 4–11)

## 2025-05-27 PROCEDURE — 36415 COLL VENOUS BLD VENIPUNCTURE: CPT

## 2025-05-27 PROCEDURE — 99203 OFFICE O/P NEW LOW 30 MIN: CPT | Performed by: INTERNAL MEDICINE

## 2025-05-27 PROCEDURE — 84443 ASSAY THYROID STIM HORMONE: CPT

## 2025-05-27 PROCEDURE — 82306 VITAMIN D 25 HYDROXY: CPT

## 2025-05-27 PROCEDURE — 3008F BODY MASS INDEX DOCD: CPT | Performed by: INTERNAL MEDICINE

## 2025-05-27 PROCEDURE — 80053 COMPREHEN METABOLIC PANEL: CPT

## 2025-05-27 PROCEDURE — 83036 HEMOGLOBIN GLYCOSYLATED A1C: CPT

## 2025-05-27 PROCEDURE — 85025 COMPLETE CBC W/AUTO DIFF WBC: CPT

## 2025-05-27 PROCEDURE — 3074F SYST BP LT 130 MM HG: CPT | Performed by: INTERNAL MEDICINE

## 2025-05-27 PROCEDURE — 3079F DIAST BP 80-89 MM HG: CPT | Performed by: INTERNAL MEDICINE

## 2025-05-27 PROCEDURE — 83690 ASSAY OF LIPASE: CPT

## 2025-05-27 PROCEDURE — 84439 ASSAY OF FREE THYROXINE: CPT

## 2025-05-27 NOTE — H&P
New Patient Evaluation - History and Physical    CONSULT - Reason for Visit:  Hypothyroidism, Pre diabetes     Requesting Physician: Dr Hopkins    CHIEF COMPLAINT:    Hypothyroidism    Pre DIabetes     HISTORY OF PRESENT ILLNESS:   Debbie Somres is a 45 year old female who presents for evaluation of Hypothyroidism and pre Diabetes   Diagnosed in her 20s   She has been on medication on and off  In the past she has been on liothyronine 25 mcg daily and Levothyroxine : 175 mcg daily   She has been off medication for about one year      Weight gain:  yes  Fatigue:  yes   Constipation: no   Cold intolerance: yes   Brain fog : yes  Cycles: yes, LMP:  May 20, 2025 ; partner uses condoms, no plans to have children    FH of thyroid disease: yes    PAST MEDICAL HISTORY:   Past Medical History[1]    PAST SURGICAL HISTORY:   Past Surgical History[2]    CURRENT MEDICATIONS:    Current Medications[3]    ALLERGIES:  Allergies[4]    SOCIAL HISTORY:    Social Hx on file[5]    FAMILY HISTORY:   Family History[6]    ASSESSMENTS:     REVIEW OF SYSTEMS:  Constitutional: Negative for:  fever, + fatigue, + cold intolerance, + weight gain   Eyes: Negative for:  Visual changes, proptosis, blurring  ENT: Negative for:  dysphagia, neck swelling, dysphonia  Respiratory: Negative for:  dyspnea, cough  Cardiovascular: Negative for:  chest pain, palpitations, orthopnea  GI: Negative for:  abdominal pain, nausea, vomiting, diarrhea, constipation, bleeding  Neurology: Negative for: headache, numbness, weakness  Genito-Urinary: Negative for: dysuria, frequency  Psychiatric: Negative for:  depression, anxiety  Hematology/Lymphatics: Negative for: bruising, lower extremity edema  Endocrine: Negative for: polyuria, polydypsia  Skin: Negative for: rash, blister, cellulitis,      PHYSICAL EXAM:   Vitals:    05/27/25 1730   BP: 121/89   Pulse: 90   Weight: (!) 313 lb (142 kg)   Height: 5' 8\" (1.727 m)     BMI: Body mass index is 47.59 kg/m².         General  Appearance:  alert, well developed, in no acute distress  Head: Atraumatic  Eyes:  normal conjunctivae, sclera., normal sclera and normal pupils  Throat/Neck: normal sound to voice. Normal hearing, normal speech, no significant thyroid enlargement palpated   Respiratory:  Speaking in full sentences, non-labored. no increased work of breathing, no audible wheezing    Neuro: motor grossly intact, moving all extremities without difficulty  Psychiatric:  oriented to time, self, and place  Extremities: no obvious extremity swelling        DATA:     Pertinent data reviewed    Tsh 11.3, 7.65 ( 2023)  Tsh from 2024 reviewed  A1c 5.9 % ( 2023)  Tpo AB positive       ASSESSMENT AND PLAN:    1 Hypothyroidism   She has hashimotos  Off medication x one year   Clinically has symptoms of hypothyroidism     I reviewed the following;   Thyroid gland and structure  Thyroid axis  Interpretation of thyroid labs      Will get labs  today and start medication based on results    2. Pre Diabetes  I reviewed the following:   - Diagnosis of prediabetes is based on the presence of impaired fasting glucose, impaired glucose tolerance, and/or elevated HbA1c levels between 5.7% and 6.4%  - The combination of diet and exercise is arguably the single most important factor that could halt the progression towards type 2 diabetes in patients with prediabetes.  - The first step in managing patients with prediabetes is to encourage strict lifestyle modifications consisting of >= 180 min of physical activity per week and a calorie intake of 1,200 to 1,800 kcal per day.  - If lifestyle changes fail, we can consider medications like metformin or GLP agonists.     Off not she has been on ozempic in the past , but developed gall stones and pancreatitis    Can consider MTF depending on results     Patient verbalized a complete  understanding of all of the above and did not have any further questions.     Call for results    RTC in 6 months/ sooner if needed        Orders Placed This Encounter   Procedures    TSH W Reflex To Free T4    Hemoglobin A1C [E]         5/27/2025  Simran Chaudhary MD        Patient verbalized a complete  understanding of all of the above and did not have any further questions.          [1]   Past Medical History:   Anxiety state    Depression    Disorder of thyroid    Dyslipidemia    Hx of motion sickness    Hypothyroid    Morbid obesity with BMI of 45.0-49.9, adult (HCC)    Nephrolithiasis    Visual impairment    GLASSES FOR DRIVING   [2]   Past Surgical History:  Procedure Laterality Date    Cholecystectomy  08/30/2024    Maddy biopsy stereo nodule 1 site right (cpt=19081)  05/25/2022    top hat clip asymmetry    Other      ovarian dermoid cyst   [3]   Current Outpatient Medications   Medication Sig Dispense Refill    sulfamethoxazole-trimethoprim -160 MG Oral Tab per tablet Take 1 tablet by mouth 2 (two) times daily. 14 tablet 0    PARoxetine 10 MG Oral Tab Take 1 tablet (10 mg total) by mouth every morning. 90 tablet 1    lisdexamfetamine (VYVANSE) 40 MG Oral Cap Take 1 capsule (40 mg total) by mouth daily. 30 capsule 0   [4]   Allergies  Allergen Reactions    Levofloxacin HIVES and RASH          [5]   Social History  Socioeconomic History    Marital status: Single   Tobacco Use    Smoking status: Never    Smokeless tobacco: Never   Vaping Use    Vaping status: Never Used   Substance and Sexual Activity    Alcohol use: Yes     Comment: 1 drink/month    Drug use: Never   [6]   Family History  Problem Relation Age of Onset    Other (dm) Father     Other (hypothyroid) Mother     Other (bladder cancer) Maternal Grandmother     Other (lung cancer) Paternal Grandmother     Colon Cancer Neg

## 2025-05-28 DIAGNOSIS — E55.9 VITAMIN D INSUFFICIENCY: ICD-10-CM

## 2025-05-28 DIAGNOSIS — E03.9 HYPOTHYROIDISM, UNSPECIFIED TYPE: Primary | ICD-10-CM

## 2025-05-28 RX ORDER — LEVOTHYROXINE SODIUM 50 UG/1
50 TABLET ORAL
Qty: 90 TABLET | Refills: 0 | Status: SHIPPED | OUTPATIENT
Start: 2025-05-28

## 2025-05-28 RX ORDER — ERGOCALCIFEROL 1.25 MG/1
CAPSULE ORAL
Qty: 25 CAPSULE | Refills: 0 | Status: SHIPPED | OUTPATIENT
Start: 2025-05-28 | End: 2025-11-24

## 2025-08-12 DIAGNOSIS — R41.840 ATTENTION OR CONCENTRATION DEFICIT: Primary | ICD-10-CM

## 2025-08-12 RX ORDER — LISDEXAMFETAMINE DIMESYLATE 40 MG/1
40 CAPSULE ORAL DAILY
Qty: 30 CAPSULE | Refills: 0 | Status: SHIPPED | OUTPATIENT
Start: 2025-08-12 | End: 2025-09-11

## 2025-08-12 RX ORDER — LISDEXAMFETAMINE DIMESYLATE 40 MG/1
40 CAPSULE ORAL DAILY
Qty: 30 CAPSULE | Refills: 0 | Status: SHIPPED | OUTPATIENT
Start: 2025-09-12 | End: 2025-10-12

## 2025-08-12 RX ORDER — LISDEXAMFETAMINE DIMESYLATE 40 MG/1
40 CAPSULE ORAL DAILY
Qty: 30 CAPSULE | Refills: 0 | Status: SHIPPED | OUTPATIENT
Start: 2025-10-13 | End: 2025-11-12

## (undated) DEVICE — BLADELESS OBTURATOR: Brand: WECK VISTA

## (undated) DEVICE — FENESTRATED BIPOLAR FORCEPS: Brand: ENDOWRIST

## (undated) DEVICE — 40580 - THE PINK PAD - ADVANCED TRENDELENBURG POSITIONING KIT: Brand: 40580 - THE PINK PAD - ADVANCED TRENDELENBURG POSITIONING KIT

## (undated) DEVICE — ADHESIVE SKIN TOP FOR WND CLSR DERMBND ADV

## (undated) DEVICE — DRAPE,TOP,102X53,STERILE: Brand: MEDLINE

## (undated) DEVICE — GLOVE SUR 7.5 SENSICARE PI PIP GRN PWD F

## (undated) DEVICE — SUT PDS II 0 36IN CT-1 ABSRB VLT L36MM 1/2

## (undated) DEVICE — LARGE HEM-O-LOK CLIP APPLIER: Brand: ENDOWRIST

## (undated) DEVICE — LAPAROVUE VISIBILITY SYSTEM LAPAROSCOPIC SOLUTIONS: Brand: LAPAROVUE

## (undated) DEVICE — ARM DRAPE

## (undated) DEVICE — CLIP INT L POLYMER LOK LIG HEM O LOK

## (undated) DEVICE — SUT MCRYL 4-0 18IN PS-2 ABSRB UD 19MM 3/8 CIR

## (undated) DEVICE — Device: Brand: SUTURE PASSOR PRO

## (undated) DEVICE — COLUMN DRAPE

## (undated) DEVICE — CANNULA SEAL

## (undated) DEVICE — SUT PDS II 1 36IN ABSRB VLT L36MM CT-1

## (undated) DEVICE — GLOVE SUR 7 SENSICARE PI PIP CRM PWD F

## (undated) DEVICE — PROGRASP FORCEPS: Brand: ENDOWRIST

## (undated) DEVICE — ANCHOR TISSUE RETRIEVAL SYSTEM, BAG SIZE 125 ML, PORT SIZE 8 MM: Brand: ANCHOR TISSUE RETRIEVAL SYSTEM

## (undated) DEVICE — ENDOPATH ULTRA VERESS INSUFFLATION NEEDLES WITH LUER LOCK CONNECTORS: Brand: ENDOPATH

## (undated) DEVICE — ROBOTIC GENERAL: Brand: MEDLINE INDUSTRIES, INC.

## (undated) DEVICE — COVER,LIGHT,CAMERA,HARD,1/PK,STRL: Brand: MEDLINE

## (undated) DEVICE — DISPOSABLE GRASPER: Brand: EPIX LAPAROSCOPIC GRASPER

## (undated) DEVICE — ZIPWIRE ZIPWIRE GUIDEWIRE .035X150 STR

## (undated) DEVICE — HUNTER GASPER TIP, DISPOSABLE: Brand: RENEW

## (undated) DEVICE — GLOVE,SURG,SENSICARE SLT,LF,PF,7: Brand: MEDLINE

## (undated) DEVICE — PERMANENT CAUTERY HOOK: Brand: ENDOWRIST

## (undated) NOTE — LETTER
[facility], [place]  Authorization for Invasive Procedures  1. I hereby authorize Dr. Kirt Gonzalez  , my physician and whomever may be designated as the doctor's assistant, to perform the following operation and/or procedure:  Stereotactic biopsy with tomosynthesis of the right breast with clip placement on Hugo Freitas at [facility].    2. My physician has explained to me the nature and purpose of the operation or other procedure, possible alternative methods of treatment, the risks involved and the possibility of complications to me. I understand the probable consequences of declining the recommended procedure and the alternative methods of treatment. I acknowledge that no guarantee has been made as to the result that may be obtained. 3. I recognize that during the course of this operation or other procedure, unforeseen conditions may necessitate additional or different procedures than those listed above. I, therefore, further authorize and request that the above-named physician, his/her physician assistants, or designees perform such procedures as are, in his/her professional opinion, necessary and desirable. If I have a Do Not Attempt Resuscitation (DNAR) order in place, that status will be suspended while in the operating room, procedural suite, and during the recovery period unless otherwise explicitly stated by me (or a person authorized to consent on my behalf). The surgeon or my attending physician will determine when the applicable recovery period ends for purposes of reinstating the DNAR order. 4. Should the need arise during my operation or immediate post-operative period; I also consent to the administration of blood and/or blood products.  Further, I understand that despite careful testing and screening of blood and blood products, I may still be subject to ill effects as a result of recieving a blood transfusion an/or blood producst. The following are some, but not all, of the potential risks that can occur: fever and allergic reactions, hemolytic reactions, transmission of disease such as hepatitis, AIDS, cytomegalovirus (CMV), and flluid overload. In the event that I wish to have autologous transfusions of my own blood, or a directed donor transfusion, I will discuss this with my physician. 5. I consent to the photographing of the operations or procedures to be performed for the purposes of advancing medicine, science, and/or education, provided my identity is not revealed. If the procedure has been videotaped, the physician/surgeon will obtain the original videotape. [facility] will not be responsible for storage or maintenance of this tape. 6. I consent to the presence of a  or observer as deemed necessary by my physician or his designee. 7. Any tissues or organs removed in the operation or other procedure may be disposed of by and at the discretion of [facility].    8. I understand that the physician and his/her physician assistants may not be employees or agents of [facility], [facility], nor [facility], but are independent medical practitioners who have been permitted to use its facilities for the care and treatment of their patients. 9. Patients having a sterilization procedure: I understand that if the procedure is successful the results will be permanent and it will therefore be impossible for me to inseminate, conceive or bear children. I also understand that the procedure is intended to result in sterility, although the result has not been guaranteed. 10. I CERTIFY THAT I HAVE READ AND FULLY UNDERSTAND THE ABOVE CONSENT TO OPERATION and/or OTHER PROCEDURE. 11. I acknowledge that my physician has explained sedation/analgesia administration to me including the risks and benefits.  I consent to the administration of sedation/analgesia as may be necessary or desirable in the judgment of my physician. Signature of Patient:  ________________________________________________ Date: _________Time: _________    Responsible person in case of minor or unconscious: _____________________________Relationship: ____________     Witness Signature: ____________________________________________ Date: __________ Time: ___________    Statement of Physician  My signature below affirms that prior to the time of the procedure, I have explained to the patient and/or her legal representative, the risks and benefits involved in the proposed treatment and any reasonable alternative to the proposed treatment. I have also explained the risks and benefits involved in the refusal of the proposed treatment and have answered the patient's questions. If I have a significant financial interest in this procedure/surgery, I have disclosed this and had a discussion with my patient.     Signature of Physician:   ________________________________________Date: _________Time:_______ Patient Name: Rosa Tran  : 1980   Printed: May 24, 2022    Medical Record #: [id number]

## (undated) NOTE — MR AVS SNAPSHOT
After Visit Summary   12/6/2021    Mignon Mclean   MRN: [id number]           Visit Information     Date & Time  12/6/2021  4:00 PM Provider  Rich Mckeon MD [address] [place] [address], [address] [place] [address],[address], [facility].  Phone  33 a time most convenient to you. If you do not have a Disconnect Account, or if you prefer to speak with someone to schedule your appointment, please call Yariel Aponte Scheduling at [phone].                Did you know that [facility] primary care

## (undated) NOTE — Clinical Note
Angel Johnston This lisbet new patient to me had a recent bout of pancreatitis (no alcohol, trg normal, no gallstones) in dec 2024 and was admitted in [place]. Well now. Cholecystectomy aug 2024. She wants to follow up with you but she got an appointment with you may 2025. Wondering if you had any thing sooner. Thanks! Hope all else well  Ava